# Patient Record
Sex: MALE | Race: WHITE | NOT HISPANIC OR LATINO | Employment: FULL TIME | URBAN - METROPOLITAN AREA
[De-identification: names, ages, dates, MRNs, and addresses within clinical notes are randomized per-mention and may not be internally consistent; named-entity substitution may affect disease eponyms.]

---

## 2017-01-23 ENCOUNTER — OFFICE VISIT (OUTPATIENT)
Dept: INTERNAL MEDICINE | Facility: CLINIC | Age: 42
End: 2017-01-23

## 2017-01-23 VITALS
DIASTOLIC BLOOD PRESSURE: 92 MMHG | TEMPERATURE: 98.3 F | BODY MASS INDEX: 25.45 KG/M2 | RESPIRATION RATE: 12 BRPM | HEIGHT: 73 IN | HEART RATE: 64 BPM | WEIGHT: 192 LBS | SYSTOLIC BLOOD PRESSURE: 142 MMHG

## 2017-01-23 DIAGNOSIS — R55 VASOVAGAL SYNCOPE: ICD-10-CM

## 2017-01-23 DIAGNOSIS — R00.0 TACHYCARDIA: Primary | ICD-10-CM

## 2017-01-23 DIAGNOSIS — F41.9 ANXIETY: ICD-10-CM

## 2017-01-23 PROCEDURE — 99203 OFFICE O/P NEW LOW 30 MIN: CPT | Performed by: FAMILY MEDICINE

## 2017-01-23 PROCEDURE — 93000 ELECTROCARDIOGRAM COMPLETE: CPT | Performed by: FAMILY MEDICINE

## 2017-01-23 NOTE — MR AVS SNAPSHOT
Jefferson Chavez   2017 11:15 AM   Office Visit    Dept Phone:  [phone]   Encounter #:  [id number]    Provider:  Parviz Mata Jr., MD   Department:  [facility]                Your Full Care Plan              Your Updated Medication List          This list is accurate as of: 17  1:03 PM.  Always use your most recent med list.                LORazepam 0.5 MG tablet   Commonly known as:  ATIVAN   Take 1 tablet by mouth every 8 (eight) hours as needed for anxiety.       sertraline 50 MG tablet   Commonly known as:  ZOLOFT               We Performed the Following     Comprehensive Metabolic Panel     ECG 12 Lead     Lipid Panel With / Chol / HDL Ratio       You Were Diagnosed With        Codes Comments    Tachycardia    -  Primary ICD-10-CM: R00.0  ICD-9-CM: 785.0     Anxiety     ICD-10-CM: F41.9  ICD-9-CM: 300.00     Vasovagal syncope     ICD-10-CM: R55  ICD-9-CM: 780.2       Instructions     None    Patient Instructions History      Upcoming Appointments     Visit Type Date Time Department    NEW PATIENT 2017 11:15 AM K  K[facility] 1 0082      [employer] Signup     [facility] [employer] allows you to send messages to your doctor, view your test results, renew your prescriptions, schedule appointments, and more. To sign up, go to [employer] and click on the Sign Up Now link in the New User? box. Enter your [employer] Activation Code exactly as it appears below along with the last four digits of your Social Security Number and your Date of Birth () to complete the sign-up process. If you do not sign up before the expiration date, you must request a new code.    [employer] Activation Code: [email or web address]  Expires: 2017  1:03 PM    If you have questions, you can email [email or web address] or call [phone] to talk to our [employer] staff. Remember, [employer] is NOT to be used for urgent needs. For medical emergencies, dial  "911.               Other Info from Your Visit           Allergies     No Known Allergies      Vital Signs     Blood Pressure Pulse Temperature Respirations Height Weight    142/92 (BP Location: Left arm, Patient Position: Sitting, Cuff Size: Adult) 64 98.3 °F (36.8 °C) (Tympanic) 12 73\" (185.4 cm) 192 lb (87.1 kg)    Body Mass Index Smoking Status                25.33 kg/m2 Former Smoker          Problems and Diagnoses Noted     Fast heart beat    -  Primary    Anxiety problem        Fainting            "

## 2017-01-23 NOTE — PROGRESS NOTES
Subjective   Jefferson Chavez is a 42 y.o. male.     Chief Complaint   Patient presents with   • Anxiety   • Loss of Consciousness   • Rapid Heart Rate         History of Present Illness   Patient is a healthy 42-year-old gentleman is been followed by psychology/psychiatry Dr. Guerrier with history of appears to be situational anxiety and panic attacks.  He is 42 years old he works his wife works he has 4 kids.  Over the past 6 or 8 months has gotten more anxious without any depression or suicidal ideation.  He has been physically well as far as he knows.  He had a panic attack several months ago where his heart seemed to raise.  He was seen and emergency department with negative findings.    Family history includes mom with some history of CAD stents.      The following portions of the patient's history were reviewed and updated as appropriate: allergies, current medications, past social history and problem list.    Review of Systems   Constitutional: Negative.    HENT: Negative.    Eyes: Negative.    Respiratory: Negative.    Cardiovascular: Positive for palpitations.   Gastrointestinal: Negative.    Endocrine: Negative.    Genitourinary: Negative.    Musculoskeletal: Negative.    Skin: Negative.    Allergic/Immunologic: Negative.    Neurological: Positive for syncope.   Hematological: Negative.    Psychiatric/Behavioral: Negative.        Objective   Vitals:    01/23/17 1140   BP: 142/92   Pulse: 64   Resp: 12   Temp: 98.3 °F (36.8 °C)     Physical Exam   Constitutional: He is oriented to person, place, and time. He appears well-developed and well-nourished.   HENT:   Head: Normocephalic and atraumatic.   Right Ear: Tympanic membrane and external ear normal.   Left Ear: Tympanic membrane and external ear normal.   Nose: Nose normal.   Mouth/Throat: Oropharynx is clear and moist.   Eyes: Conjunctivae and EOM are normal. Pupils are equal, round, and reactive to light.   Neck: Normal range of motion. Neck supple. No JVD  present. No thyromegaly present.   Cardiovascular: Normal rate, regular rhythm, normal heart sounds and intact distal pulses.    Pulmonary/Chest: Effort normal and breath sounds normal.   Abdominal: Soft. Bowel sounds are normal.   Musculoskeletal: Normal range of motion.   Lymphadenopathy:     He has no cervical adenopathy.   Neurological: He is alert and oriented to person, place, and time. No cranial nerve deficit. Coordination normal.   Skin: Skin is warm and dry. No rash noted.   Psychiatric: He has a normal mood and affect. His behavior is normal. Judgment and thought content normal.   Vitals reviewed.      Assessment/Plan   Problem List Items Addressed This Visit     None      Visit Diagnoses     Tachycardia    -  Primary    Relevant Orders    Comprehensive Metabolic Panel    Lipid Panel With / Chol / HDL Ratio    ECG 12 Lead    Anxiety        Relevant Orders    Comprehensive Metabolic Panel    Lipid Panel With / Chol / HDL Ratio    Vasovagal syncope        Relevant Orders    Comprehensive Metabolic Panel    Lipid Panel With / Chol / HDL Ratio       EKG performed shows normal sinus rhythm sinus bradycardia.  He states he always passes out when he gets blood drawn.  He has a pretty profound vasovagal response.  Will get CMP lipid panel.  Recheck 6-12 months and when necessary.  Continue follow-up with psychiatry.

## 2017-01-24 LAB
ALBUMIN SERPL-MCNC: 5 G/DL (ref 3.5–5.2)
ALBUMIN/GLOB SERPL: 2 G/DL
ALP SERPL-CCNC: 82 U/L (ref 39–117)
ALT SERPL-CCNC: 57 U/L (ref 1–41)
AST SERPL-CCNC: 34 U/L (ref 1–40)
BILIRUB SERPL-MCNC: 0.7 MG/DL (ref 0.1–1.2)
BUN SERPL-MCNC: 13 MG/DL (ref 6–20)
BUN/CREAT SERPL: 11.7 (ref 7–25)
CALCIUM SERPL-MCNC: 9.8 MG/DL (ref 8.6–10.5)
CHLORIDE SERPL-SCNC: 99 MMOL/L (ref 98–107)
CHOLEST SERPL-MCNC: 307 MG/DL (ref 0–200)
CHOLEST/HDLC SERPL: 4.87 {RATIO}
CO2 SERPL-SCNC: 29 MMOL/L (ref 22–29)
CREAT SERPL-MCNC: 1.11 MG/DL (ref 0.76–1.27)
GLOBULIN SER CALC-MCNC: 2.5 GM/DL
GLUCOSE SERPL-MCNC: 103 MG/DL (ref 65–99)
HDLC SERPL-MCNC: 63 MG/DL (ref 40–60)
LDLC SERPL CALC-MCNC: 199 MG/DL (ref 0–100)
POTASSIUM SERPL-SCNC: 4.5 MMOL/L (ref 3.5–5.2)
PROT SERPL-MCNC: 7.5 G/DL (ref 6–8.5)
SODIUM SERPL-SCNC: 140 MMOL/L (ref 136–145)
TRIGL SERPL-MCNC: 224 MG/DL (ref 0–150)
VLDLC SERPL CALC-MCNC: 44.8 MG/DL (ref 5–40)

## 2018-01-25 ENCOUNTER — OFFICE VISIT (OUTPATIENT)
Dept: INTERNAL MEDICINE | Facility: CLINIC | Age: 43
End: 2018-01-25

## 2018-01-25 ENCOUNTER — HOSPITAL ENCOUNTER (OUTPATIENT)
Dept: GENERAL RADIOLOGY | Facility: HOSPITAL | Age: 43
Discharge: HOME | End: 2018-01-25
Admitting: FAMILY MEDICINE

## 2018-01-25 VITALS
HEART RATE: 64 BPM | WEIGHT: 189 LBS | BODY MASS INDEX: 24.94 KG/M2 | OXYGEN SATURATION: 98 % | TEMPERATURE: 97.9 F | DIASTOLIC BLOOD PRESSURE: 88 MMHG | SYSTOLIC BLOOD PRESSURE: 128 MMHG

## 2018-01-25 DIAGNOSIS — F32.89 OTHER DEPRESSION: ICD-10-CM

## 2018-01-25 DIAGNOSIS — F98.8 ATTENTION DEFICIT DISORDER (ADD) WITHOUT HYPERACTIVITY: Primary | ICD-10-CM

## 2018-01-25 DIAGNOSIS — R05.3 CHRONIC COUGH: ICD-10-CM

## 2018-01-25 DIAGNOSIS — Z00.00 ENCOUNTER FOR WELLNESS EXAMINATION IN ADULT: ICD-10-CM

## 2018-01-25 PROCEDURE — 99396 PREV VISIT EST AGE 40-64: CPT | Performed by: FAMILY MEDICINE

## 2018-01-25 PROCEDURE — 99214 OFFICE O/P EST MOD 30 MIN: CPT | Performed by: FAMILY MEDICINE

## 2018-01-25 PROCEDURE — 71046 X-RAY EXAM CHEST 2 VIEWS: CPT

## 2018-01-25 RX ORDER — LISDEXAMFETAMINE DIMESYLATE 30 MG/1
30 CAPSULE ORAL EVERY MORNING
Qty: 30 CAPSULE | Refills: 0 | Status: SHIPPED | OUTPATIENT
Start: 2018-01-25 | End: 2018-01-25 | Stop reason: SDUPTHER

## 2018-01-25 RX ORDER — LISDEXAMFETAMINE DIMESYLATE 30 MG/1
30 CAPSULE ORAL EVERY MORNING
Qty: 30 CAPSULE | Refills: 0 | Status: SHIPPED | OUTPATIENT
Start: 2018-01-25 | End: 2020-01-07

## 2018-01-25 RX ORDER — LISDEXAMFETAMINE DIMESYLATE 30 MG/1
CAPSULE ORAL
Refills: 0 | COMMUNITY
Start: 2017-11-22 | End: 2018-01-25 | Stop reason: SDUPTHER

## 2018-01-25 RX ORDER — GABAPENTIN 300 MG/1
CAPSULE ORAL
COMMUNITY
Start: 2018-01-23 | End: 2020-01-07

## 2018-01-25 RX ORDER — SERTRALINE HYDROCHLORIDE 100 MG/1
TABLET, FILM COATED ORAL
COMMUNITY
Start: 2018-01-23 | End: 2018-05-11 | Stop reason: SDUPTHER

## 2018-01-25 NOTE — PROGRESS NOTES
Subjective   Jefferson Chavez is a 43 y.o. male.     Chief Complaint   Patient presents with   • Annual Exam   • Cough   • ADD         History of Present Illness   Jefferson is here for annual recheck.  His insurance won't pay for a psychiatrist and he is treated for  depression and ADD.  We decided to try to get off gabapentin and I'll resume treating him for ADD with depression taking sertraline and low-dose Vyvanse 30 mg.  He has no suicidal ideation.  He is quite active.  He has had a chronic cough and little wheezing for 30 days.  No fever or chills.  He works in an area that receives [employer].      The following portions of the patient's history were reviewed and updated as appropriate: allergies, current medications, past social history and problem list.    Review of Systems   Constitutional: Negative.    HENT: Negative.    Eyes: Negative.    Respiratory: Positive for cough.    Cardiovascular: Negative.    Gastrointestinal: Negative.    Endocrine: Negative.    Genitourinary: Negative.    Musculoskeletal: Negative.    Skin: Negative.    Allergic/Immunologic: Negative.    Neurological: Negative.    Hematological: Negative.    Psychiatric/Behavioral: Positive for decreased concentration.       Objective   Vitals:    01/25/18 1143   BP: 128/88   Pulse: 64   Temp: 97.9 °F (36.6 °C)   SpO2: 98%     Physical Exam   Constitutional: He is oriented to person, place, and time. He appears well-developed and well-nourished.   HENT:   Head: Normocephalic and atraumatic.   Right Ear: Tympanic membrane and external ear normal.   Left Ear: Tympanic membrane and external ear normal.   Nose: Nose normal.   Mouth/Throat: Oropharynx is clear and moist.   Eyes: Conjunctivae and EOM are normal. Pupils are equal, round, and reactive to light.   Neck: Normal range of motion. Neck supple. No JVD present. No thyromegaly present.   Cardiovascular: Normal rate, regular rhythm, normal heart sounds and intact distal pulses.     Pulmonary/Chest: Effort normal and breath sounds normal.           Abdominal: Soft. Bowel sounds are normal.   Musculoskeletal: Normal range of motion.   Lymphadenopathy:     He has no cervical adenopathy.   Neurological: He is alert and oriented to person, place, and time. No cranial nerve deficit. Coordination normal.   Skin: Skin is warm and dry. No rash noted.   Psychiatric: He has a normal mood and affect. His behavior is normal. Judgment and thought content normal.   Vitals reviewed.      Assessment/Plan   Problem List Items Addressed This Visit     None      Visit Diagnoses     Attention deficit disorder (ADD) without hyperactivity    -  Primary    Relevant Medications    sertraline (ZOLOFT) 100 MG tablet    VYVANSE 30 MG capsule    Other Relevant Orders    CBC & Differential    Comprehensive Metabolic Panel    Lipid Panel With / Chol / HDL Ratio    Encounter for wellness examination in adult        Relevant Orders    CBC & Differential    Comprehensive Metabolic Panel    Lipid Panel With / Chol / HDL Ratio    Chronic cough        Relevant Orders    XR Chest 2 View    CBC & Differential    Comprehensive Metabolic Panel    Lipid Panel With / Chol / HDL Ratio    Other depression        Relevant Medications    sertraline (ZOLOFT) 100 MG tablet    VYVANSE 30 MG capsule    Other Relevant Orders    CBC & Differential    Comprehensive Metabolic Panel    Lipid Panel With / Chol / HDL Ratio      Plan: Chest x-ray PA lateral return for fasting labs in 6 months preceding the visit in July.  Vyvanse 30 mg every morning.  Refill Zoloft when needed.

## 2018-01-26 RX ORDER — AZITHROMYCIN 250 MG/1
TABLET, FILM COATED ORAL
Qty: 6 TABLET | Refills: 0 | Status: SHIPPED | OUTPATIENT
Start: 2018-01-26 | End: 2018-03-15 | Stop reason: SDUPTHER

## 2018-03-15 ENCOUNTER — TELEPHONE (OUTPATIENT)
Dept: INTERNAL MEDICINE | Facility: CLINIC | Age: 43
End: 2018-03-15

## 2018-03-15 DIAGNOSIS — R05.3 CHRONIC COUGH: Primary | ICD-10-CM

## 2018-03-15 RX ORDER — AZITHROMYCIN 250 MG/1
TABLET, FILM COATED ORAL
Qty: 6 TABLET | Refills: 0 | Status: SHIPPED | OUTPATIENT
Start: 2018-03-15 | End: 2020-01-07

## 2018-03-15 NOTE — TELEPHONE ENCOUNTER
Zithromax Z-GERARD to 50 mg #6.  Referral for pulmonary function testing to check for asthma and reactive airway disease.  Referral also to pulmonary Dr. Poe first available.

## 2018-03-15 NOTE — TELEPHONE ENCOUNTER
Pt came in back in January for a cough and it hasn't gone away if anything he thinks it worse  Please advise

## 2018-03-27 ENCOUNTER — HOSPITAL ENCOUNTER (OUTPATIENT)
Dept: RESPIRATORY THERAPY | Facility: HOSPITAL | Age: 43
Discharge: HOME | End: 2018-03-27
Admitting: FAMILY MEDICINE

## 2018-03-27 DIAGNOSIS — R05.3 CHRONIC COUGH: ICD-10-CM

## 2018-03-27 PROCEDURE — 94729 DIFFUSING CAPACITY: CPT

## 2018-03-27 PROCEDURE — 94640 AIRWAY INHALATION TREATMENT: CPT

## 2018-03-27 PROCEDURE — 94060 EVALUATION OF WHEEZING: CPT

## 2018-03-27 PROCEDURE — 94726 PLETHYSMOGRAPHY LUNG VOLUMES: CPT

## 2018-03-27 RX ORDER — ALBUTEROL SULFATE 2.5 MG/3ML
2.5 SOLUTION RESPIRATORY (INHALATION) ONCE
Status: COMPLETED | OUTPATIENT
Start: 2018-03-27 | End: 2018-03-27

## 2018-03-27 RX ADMIN — ALBUTEROL SULFATE 2.5 MG: 2.5 SOLUTION RESPIRATORY (INHALATION) at 10:08

## 2018-05-11 RX ORDER — SERTRALINE HYDROCHLORIDE 100 MG/1
100 TABLET, FILM COATED ORAL DAILY
Qty: 30 TABLET | Refills: 1 | Status: SHIPPED | OUTPATIENT
Start: 2018-05-11 | End: 2018-07-16 | Stop reason: SDUPTHER

## 2018-07-16 RX ORDER — SERTRALINE HYDROCHLORIDE 100 MG/1
100 TABLET, FILM COATED ORAL DAILY
Qty: 30 TABLET | Refills: 0 | Status: SHIPPED | OUTPATIENT
Start: 2018-07-16 | End: 2018-08-13 | Stop reason: SDUPTHER

## 2018-07-25 ENCOUNTER — RESULTS ENCOUNTER (OUTPATIENT)
Dept: INTERNAL MEDICINE | Facility: CLINIC | Age: 43
End: 2018-07-25

## 2018-07-25 DIAGNOSIS — Z00.00 ENCOUNTER FOR WELLNESS EXAMINATION IN ADULT: ICD-10-CM

## 2018-07-25 DIAGNOSIS — F98.8 ATTENTION DEFICIT DISORDER (ADD) WITHOUT HYPERACTIVITY: ICD-10-CM

## 2018-07-25 DIAGNOSIS — F32.89 OTHER DEPRESSION: ICD-10-CM

## 2018-07-25 DIAGNOSIS — R05.3 CHRONIC COUGH: ICD-10-CM

## 2018-08-13 RX ORDER — SERTRALINE HYDROCHLORIDE 100 MG/1
100 TABLET, FILM COATED ORAL DAILY
Qty: 30 TABLET | Refills: 0 | Status: SHIPPED | OUTPATIENT
Start: 2018-08-13 | End: 2018-11-06 | Stop reason: SDUPTHER

## 2018-09-25 RX ORDER — SERTRALINE HYDROCHLORIDE 100 MG/1
100 TABLET, FILM COATED ORAL DAILY
Qty: 30 TABLET | Refills: 0 | OUTPATIENT
Start: 2018-09-25

## 2018-11-06 RX ORDER — SERTRALINE HYDROCHLORIDE 100 MG/1
100 TABLET, FILM COATED ORAL DAILY
Qty: 90 TABLET | Refills: 0 | Status: SHIPPED | OUTPATIENT
Start: 2018-11-06 | End: 2019-01-08 | Stop reason: SDUPTHER

## 2019-01-08 RX ORDER — SERTRALINE HYDROCHLORIDE 100 MG/1
100 TABLET, FILM COATED ORAL DAILY
Qty: 90 TABLET | Refills: 1 | Status: SHIPPED | OUTPATIENT
Start: 2019-01-08 | End: 2019-08-04 | Stop reason: SDUPTHER

## 2019-08-05 RX ORDER — SERTRALINE HYDROCHLORIDE 100 MG/1
100 TABLET, FILM COATED ORAL DAILY
Qty: 90 TABLET | Refills: 0 | Status: SHIPPED | OUTPATIENT
Start: 2019-08-05 | End: 2019-11-26 | Stop reason: SDUPTHER

## 2019-11-08 RX ORDER — SERTRALINE HYDROCHLORIDE 100 MG/1
100 TABLET, FILM COATED ORAL DAILY
Qty: 90 TABLET | Refills: 0 | OUTPATIENT
Start: 2019-11-08

## 2019-11-18 RX ORDER — SERTRALINE HYDROCHLORIDE 100 MG/1
100 TABLET, FILM COATED ORAL DAILY
Qty: 90 TABLET | Refills: 0 | OUTPATIENT
Start: 2019-11-18

## 2019-11-26 RX ORDER — SERTRALINE HYDROCHLORIDE 100 MG/1
100 TABLET, FILM COATED ORAL DAILY
Qty: 90 TABLET | Refills: 0 | Status: SHIPPED | OUTPATIENT
Start: 2019-11-26 | End: 2020-01-07 | Stop reason: SDUPTHER

## 2020-01-07 ENCOUNTER — OFFICE VISIT (OUTPATIENT)
Dept: INTERNAL MEDICINE | Facility: CLINIC | Age: 45
End: 2020-01-07

## 2020-01-07 VITALS
HEART RATE: 75 BPM | TEMPERATURE: 98.2 F | BODY MASS INDEX: 26.78 KG/M2 | OXYGEN SATURATION: 96 % | DIASTOLIC BLOOD PRESSURE: 74 MMHG | SYSTOLIC BLOOD PRESSURE: 140 MMHG | WEIGHT: 203 LBS

## 2020-01-07 DIAGNOSIS — F32.A DEPRESSION, UNSPECIFIED DEPRESSION TYPE: ICD-10-CM

## 2020-01-07 DIAGNOSIS — Z99.89 OSA ON CPAP: Primary | ICD-10-CM

## 2020-01-07 DIAGNOSIS — E78.2 MIXED HYPERLIPIDEMIA: ICD-10-CM

## 2020-01-07 DIAGNOSIS — G47.33 OSA ON CPAP: Primary | ICD-10-CM

## 2020-01-07 PROCEDURE — 99213 OFFICE O/P EST LOW 20 MIN: CPT | Performed by: FAMILY MEDICINE

## 2020-01-07 RX ORDER — SERTRALINE HYDROCHLORIDE 100 MG/1
100 TABLET, FILM COATED ORAL DAILY
Qty: 90 TABLET | Refills: 3 | Status: SHIPPED | OUTPATIENT
Start: 2020-01-07 | End: 2021-01-14 | Stop reason: SDUPTHER

## 2020-01-07 NOTE — PROGRESS NOTES
Subjective   Jefferson Chavez is a 44 y.o. male.     Chief Complaint   Patient presents with   • Depression   • Hyperlipidemia         History of Present Illness   Delightful gentleman with a history of depression well treated with sertraline 100 mg daily.  He is off stimulants for history of ADD because it caused too many side effects.  He does well with sertraline 100 mg daily we will continue this he is very functional.  I also discussed cardiovascular risk factors he does pass out drawing blood 2 years ago he had very elevated cholesterol.  There is a family history of hypercholesterolemia and his mother has had a series of a couple of heart attacks.      The following portions of the patient's history were reviewed and updated as appropriate: allergies, current medications, past social history and problem list.    Review of Systems   Constitutional: Negative.    HENT: Negative.    Eyes: Negative.    Respiratory: Negative.    Cardiovascular: Negative.    Gastrointestinal: Negative.    Endocrine: Negative.    Genitourinary: Negative.    Musculoskeletal: Negative.    Skin: Negative.    Allergic/Immunologic: Negative.    Neurological: Negative.    Hematological: Negative.    Psychiatric/Behavioral: Negative.        Objective   Vitals:    01/07/20 1234   BP: 140/74   Pulse: 75   Temp: 98.2 °F (36.8 °C)   SpO2: 96%     Physical Exam   Constitutional: He is oriented to person, place, and time. He appears well-developed and well-nourished.   HENT:   Head: Normocephalic and atraumatic.   Right Ear: Tympanic membrane and external ear normal.   Left Ear: Tympanic membrane and external ear normal.   Nose: Nose normal.   Mouth/Throat: Oropharynx is clear and moist.   Eyes: Pupils are equal, round, and reactive to light. Conjunctivae and EOM are normal.   Neck: Normal range of motion. Neck supple. No JVD present. No thyromegaly present.   Cardiovascular: Normal rate, regular rhythm, normal heart sounds and intact distal  pulses.   Pulmonary/Chest: Effort normal and breath sounds normal.   Abdominal: Soft. Bowel sounds are normal.   Musculoskeletal: Normal range of motion.   Lymphadenopathy:     He has no cervical adenopathy.   Neurological: He is alert and oriented to person, place, and time. No cranial nerve deficit. Coordination normal.   Skin: Skin is warm and dry. No rash noted.   Psychiatric: He has a normal mood and affect. His behavior is normal. Judgment and thought content normal.   Vitals reviewed.      Assessment/Plan   Problem List Items Addressed This Visit     None      Visit Diagnoses     SELENA on CPAP    -  Primary    Depression, unspecified depression type        Relevant Medications    sertraline (ZOLOFT) 100 MG tablet    Mixed hyperlipidemia        Relevant Orders    Comprehensive Metabolic Panel    Lipid Panel With / Chol / HDL Ratio      Plan: He has obstructive sleep apnea on CPAP nasal pillows refill sertraline 100 mg daily for a year.  We will have him return with his wife to get CMP lipid panel fasting.

## 2020-02-07 ENCOUNTER — RESULTS ENCOUNTER (OUTPATIENT)
Dept: INTERNAL MEDICINE | Facility: CLINIC | Age: 45
End: 2020-02-07

## 2020-02-07 DIAGNOSIS — E78.2 MIXED HYPERLIPIDEMIA: ICD-10-CM

## 2020-10-29 ENCOUNTER — RESULTS ENCOUNTER (OUTPATIENT)
Dept: INTERNAL MEDICINE | Facility: CLINIC | Age: 45
End: 2020-10-29

## 2020-10-29 DIAGNOSIS — E78.2 MIXED HYPERLIPIDEMIA: ICD-10-CM

## 2020-10-29 LAB
ALBUMIN SERPL-MCNC: 4.9 G/DL (ref 3.5–5.2)
ALBUMIN/GLOB SERPL: 2.5 G/DL
ALP SERPL-CCNC: 95 U/L (ref 39–117)
ALT SERPL-CCNC: 155 U/L (ref 1–41)
AST SERPL-CCNC: 88 U/L (ref 1–40)
BILIRUB SERPL-MCNC: 1 MG/DL (ref 0–1.2)
BUN SERPL-MCNC: 17 MG/DL (ref 6–20)
BUN/CREAT SERPL: 13.9 (ref 7–25)
CALCIUM SERPL-MCNC: 9.6 MG/DL (ref 8.6–10.5)
CHLORIDE SERPL-SCNC: 100 MMOL/L (ref 98–107)
CHOLEST SERPL-MCNC: 324 MG/DL (ref 0–200)
CHOLEST/HDLC SERPL: 5.68 {RATIO}
CO2 SERPL-SCNC: 27.1 MMOL/L (ref 22–29)
CREAT SERPL-MCNC: 1.22 MG/DL (ref 0.76–1.27)
GLOBULIN SER CALC-MCNC: 2 GM/DL
GLUCOSE SERPL-MCNC: 106 MG/DL (ref 65–99)
HDLC SERPL-MCNC: 57 MG/DL (ref 40–60)
LDLC SERPL CALC-MCNC: 226 MG/DL (ref 0–100)
POTASSIUM SERPL-SCNC: 4.5 MMOL/L (ref 3.5–5.2)
PROT SERPL-MCNC: 6.9 G/DL (ref 6–8.5)
SODIUM SERPL-SCNC: 138 MMOL/L (ref 136–145)
TRIGL SERPL-MCNC: 207 MG/DL (ref 0–150)
VLDLC SERPL CALC-MCNC: 41 MG/DL (ref 5–40)

## 2020-11-13 ENCOUNTER — TELEPHONE (OUTPATIENT)
Dept: INTERNAL MEDICINE | Facility: CLINIC | Age: 45
End: 2020-11-13

## 2020-11-13 RX ORDER — ROSUVASTATIN CALCIUM 5 MG/1
5 TABLET, COATED ORAL DAILY
Qty: 90 TABLET | Refills: 1 | Status: SHIPPED | OUTPATIENT
Start: 2020-11-13 | End: 2021-01-14 | Stop reason: SDUPTHER

## 2020-11-13 NOTE — TELEPHONE ENCOUNTER
"Caller: Jefferson Chavez    Relationship: Self    Best call back number: [phone]    What medication are you requesting: A LOW DOSE CHOLESTEROL MEDICATION     If a prescription is needed, what is your preferred pharmacy and phone number: [id number] [address] AT [facility] [phone] [facility] [phone] FX     Additional notes: PATIENT RECEIVED HIS LAB RESULTS IN THE MAIL AND AT THE BOTTOM OF THE RESULTS THERE WAS A NOTATION THAT DR HEBERT WAS GOING TO SEND IN A LOW DOSE CHOLESTEROL MEDICATION DUE TO \"SOME ALARMINGLY HIGH RESULTS\" BUT PATIENT HAS CONTACTED THE PHARMACY AND NOTHING HAS BEEN CALLED IN AS OF YET SO HE WANTED TO CHECK WITH DR HEBERT.     PLEASE ADVISE           "

## 2021-01-14 ENCOUNTER — OFFICE VISIT (OUTPATIENT)
Dept: INTERNAL MEDICINE | Facility: CLINIC | Age: 46
End: 2021-01-14

## 2021-01-14 VITALS
BODY MASS INDEX: 27.3 KG/M2 | WEIGHT: 206 LBS | OXYGEN SATURATION: 98 % | HEIGHT: 73 IN | TEMPERATURE: 97.5 F | DIASTOLIC BLOOD PRESSURE: 88 MMHG | SYSTOLIC BLOOD PRESSURE: 130 MMHG | HEART RATE: 68 BPM

## 2021-01-14 DIAGNOSIS — E78.2 MIXED HYPERLIPIDEMIA: ICD-10-CM

## 2021-01-14 DIAGNOSIS — Z00.00 ANNUAL PHYSICAL EXAM: ICD-10-CM

## 2021-01-14 DIAGNOSIS — B35.4 TINEA CORPORIS: Primary | ICD-10-CM

## 2021-01-14 PROCEDURE — 99396 PREV VISIT EST AGE 40-64: CPT | Performed by: FAMILY MEDICINE

## 2021-01-14 RX ORDER — SERTRALINE HYDROCHLORIDE 100 MG/1
100 TABLET, FILM COATED ORAL DAILY
Qty: 90 TABLET | Refills: 3 | Status: SHIPPED | OUTPATIENT
Start: 2021-01-14 | End: 2021-07-09 | Stop reason: SDUPTHER

## 2021-01-14 RX ORDER — ROSUVASTATIN CALCIUM 5 MG/1
5 TABLET, COATED ORAL DAILY
Qty: 90 TABLET | Refills: 3 | Status: SHIPPED | OUTPATIENT
Start: 2021-01-14 | End: 2021-07-09 | Stop reason: SDUPTHER

## 2021-01-14 RX ORDER — KETOCONAZOLE 20 MG/G
CREAM TOPICAL DAILY
Qty: 60 G | Refills: 1 | Status: SHIPPED | OUTPATIENT
Start: 2021-01-14 | End: 2021-07-09

## 2021-01-14 NOTE — PROGRESS NOTES
"Chief Complaint  Annual Exam (Physical)    Subjective          Jefferson Chavez presents to [facility] for   Very pleasant gentleman here for annual physical.  Issues discussed include diet exercise and also hyperlipidemia.  He started on Crestor 5 mg daily in November.  We will repeat labs in approximate 4 months he has been on a relatively low-fat diet.    He has developed tinea corporis/ringworm in the antecubital fossa of the right arm.      Objective   Vital Signs:   /88 (BP Location: Right arm, Patient Position: Sitting, Cuff Size: Adult)   Pulse 68   Temp 97.5 °F (36.4 °C)   Ht 185.4 cm (73\")   Wt 93.4 kg (206 lb)   SpO2 98%   BMI 27.18 kg/m²     Physical Exam  Vitals signs reviewed.   Constitutional:       Appearance: He is well-developed.   HENT:      Head: Normocephalic and atraumatic.      Right Ear: Tympanic membrane and external ear normal.      Left Ear: Tympanic membrane and external ear normal.      Nose: Nose normal.   Eyes:      Conjunctiva/sclera: Conjunctivae normal.      Pupils: Pupils are equal, round, and reactive to light.   Neck:      Musculoskeletal: Normal range of motion and neck supple.      Thyroid: No thyromegaly.      Vascular: No JVD.   Cardiovascular:      Rate and Rhythm: Normal rate and regular rhythm.      Heart sounds: Normal heart sounds.   Pulmonary:      Effort: Pulmonary effort is normal.      Breath sounds: Normal breath sounds.   Abdominal:      General: Bowel sounds are normal.      Palpations: Abdomen is soft.   Musculoskeletal: Normal range of motion.   Lymphadenopathy:      Cervical: No cervical adenopathy.   Skin:     General: Skin is warm and dry.      Findings: No rash.          Neurological:      Mental Status: He is alert and oriented to person, place, and time.      Cranial Nerves: No cranial nerve deficit.      Coordination: Coordination normal.   Psychiatric:         Behavior: Behavior normal.         Thought " Content: Thought content normal.         Judgment: Judgment normal.        Result Review :   The following data was reviewed by: Parviz Mata Jr., MD on 01/14/2021:  Common labs    Common Labsle 10/29/20 10/29/20    1002 1002   Glucose 106 (A)    BUN 17    Creatinine 1.22    eGFR Non  Am 65    eGFR African Am 79    Sodium 138    Potassium 4.5    Chloride 100    Calcium 9.6    Total Protein 6.9    Albumin 4.90    Total Bilirubin 1.0    Alkaline Phosphatase 95    AST (SGOT) 88 (A)    ALT (SGPT) 155 (A)    Total Cholesterol  324 (A)   Triglycerides  207 (A)   HDL Cholesterol  57   LDL Cholesterol   226 (A)   (A) Abnormal value                      Assessment and Plan    Problem List Items Addressed This Visit     None      Visit Diagnoses     Tinea corporis    -  Primary    Annual physical exam        Mixed hyperlipidemia        Relevant Medications    rosuvastatin (Crestor) 5 MG tablet    Other Relevant Orders    Comprehensive Metabolic Panel    Lipid Panel With / Chol / HDL Ratio      Plan: Ketoconazole applied daily prescription 30 days or more if no resolution referral to dermatology.    Continue Crestor 5 mg daily.    Repeat labs within the next 4 months.  Recheck in 6 months.  Discussion of healthy lifestyle diet exercise health maintenance.    Follow Up   Return in about 6 months (around 7/14/2021) for Annual physical.  Patient was given instructions and counseling regarding his condition or for health maintenance advice. Please see specific information pulled into the AVS if appropriate.

## 2021-07-09 ENCOUNTER — OFFICE VISIT (OUTPATIENT)
Dept: INTERNAL MEDICINE | Facility: CLINIC | Age: 46
End: 2021-07-09

## 2021-07-09 VITALS
OXYGEN SATURATION: 98 % | DIASTOLIC BLOOD PRESSURE: 100 MMHG | HEIGHT: 73 IN | WEIGHT: 209 LBS | HEART RATE: 73 BPM | TEMPERATURE: 98.5 F | SYSTOLIC BLOOD PRESSURE: 162 MMHG | BODY MASS INDEX: 27.7 KG/M2

## 2021-07-09 DIAGNOSIS — K76.0 FATTY LIVER: ICD-10-CM

## 2021-07-09 DIAGNOSIS — R74.8 ELEVATED LIVER ENZYMES: ICD-10-CM

## 2021-07-09 DIAGNOSIS — E78.2 MIXED HYPERLIPIDEMIA: Primary | ICD-10-CM

## 2021-07-09 DIAGNOSIS — F41.9 ANXIETY: ICD-10-CM

## 2021-07-09 DIAGNOSIS — I10 ESSENTIAL HYPERTENSION: ICD-10-CM

## 2021-07-09 PROCEDURE — 99214 OFFICE O/P EST MOD 30 MIN: CPT | Performed by: FAMILY MEDICINE

## 2021-07-09 RX ORDER — SERTRALINE HYDROCHLORIDE 100 MG/1
100 TABLET, FILM COATED ORAL DAILY
Qty: 90 TABLET | Refills: 3 | Status: SHIPPED | OUTPATIENT
Start: 2021-07-09 | End: 2022-05-27 | Stop reason: SDUPTHER

## 2021-07-09 RX ORDER — ROSUVASTATIN CALCIUM 5 MG/1
5 TABLET, COATED ORAL DAILY
Qty: 90 TABLET | Refills: 3 | Status: SHIPPED | OUTPATIENT
Start: 2021-07-09 | End: 2022-03-14

## 2021-07-09 RX ORDER — LOSARTAN POTASSIUM 25 MG/1
25 TABLET ORAL DAILY
Qty: 90 TABLET | Refills: 3 | Status: SHIPPED | OUTPATIENT
Start: 2021-07-09 | End: 2022-05-27 | Stop reason: SDUPTHER

## 2021-07-09 NOTE — PROGRESS NOTES
"Chief Complaint  Follow-up, Anxiety, and Hyperlipidemia    Subjective          Jefferson Chavez presents to [facility]  Very pleasant gentleman noted to have elevated liver enzymes and genetically elevated lipid panel with initiation of rosuvastatin 5 mg daily on last visit with continuance of sertraline 100 mg daily for anxiety.  His blood pressures continue to be elevated when he is counseled on home blood pressure monitoring we will start losartan 25 mg daily.  Labs will be drawn for hepatitis C as well as repeat liver enzymes lipid panel.    Counseled on cardiovascular risk factors and hyperlipidemia hypertension and also etiology and possible etiologies of elevated liver enzymes and also fatty liver.      Objective   Vital Signs:   /100   Pulse 73   Temp 98.5 °F (36.9 °C)   Ht 185.4 cm (73\")   Wt 94.8 kg (209 lb)   SpO2 98%   BMI 27.57 kg/m²     Physical Exam  Vitals reviewed.   Constitutional:       Appearance: He is well-developed.   HENT:      Head: Normocephalic and atraumatic.      Right Ear: Tympanic membrane and external ear normal.      Left Ear: Tympanic membrane and external ear normal.   Eyes:      Conjunctiva/sclera: Conjunctivae normal.      Pupils: Pupils are equal, round, and reactive to light.   Neck:      Thyroid: No thyromegaly.      Vascular: No JVD.   Cardiovascular:      Rate and Rhythm: Normal rate and regular rhythm.      Heart sounds: Normal heart sounds.   Pulmonary:      Effort: Pulmonary effort is normal.      Breath sounds: Normal breath sounds.   Abdominal:      General: Bowel sounds are normal.      Palpations: Abdomen is soft.   Musculoskeletal:         General: Normal range of motion.      Cervical back: Normal range of motion and neck supple.   Lymphadenopathy:      Cervical: No cervical adenopathy.   Skin:     General: Skin is warm and dry.      Findings: No rash.   Neurological:      Mental Status: He is alert and oriented to person, " place, and time.      Cranial Nerves: No cranial nerve deficit.      Coordination: Coordination normal.   Psychiatric:         Behavior: Behavior normal.         Thought Content: Thought content normal.         Judgment: Judgment normal.        Result Review :{Labs  Result Review  Imaging  Med Tab  Media  Procedures :23}                 Assessment and Plan    Diagnoses and all orders for this visit:    1. Mixed hyperlipidemia (Primary)  Comments:  Continue Crestor 5 mg daily  Orders:  -     CBC & Differential  -     Comprehensive Metabolic Panel  -     Lipid Panel With / Chol / HDL Ratio  -     TSH  -     T4, Free  -     Urinalysis With Microscopic If Indicated (No Culture) - Urine, Clean Catch  -     Vitamin B12  -     Folate  -     Hepatitis C Antibody  -     HARO Fibrosure    2. Essential hypertension  Comments:  Losartan 25 mg daily  Orders:  -     CBC & Differential  -     Comprehensive Metabolic Panel  -     Lipid Panel With / Chol / HDL Ratio  -     TSH  -     T4, Free  -     Urinalysis With Microscopic If Indicated (No Culture) - Urine, Clean Catch  -     Vitamin B12  -     Folate  -     Hepatitis C Antibody  -     HARO Fibrosure    3. Anxiety  Comments:  Zoloft 100 mg daily    4. Elevated liver enzymes  -     CBC & Differential  -     Comprehensive Metabolic Panel  -     Lipid Panel With / Chol / HDL Ratio  -     TSH  -     T4, Free  -     Urinalysis With Microscopic If Indicated (No Culture) - Urine, Clean Catch  -     Vitamin B12  -     Folate  -     Hepatitis C Antibody  -     HARO Fibrosure    5. Fatty liver  -     CBC & Differential  -     Comprehensive Metabolic Panel  -     Lipid Panel With / Chol / HDL Ratio  -     TSH  -     T4, Free  -     Urinalysis With Microscopic If Indicated (No Culture) - Urine, Clean Catch  -     Vitamin B12  -     Folate  -     Hepatitis C Antibody  -     HARO Fibrosure    Other orders  -     sertraline (ZOLOFT) 100 MG tablet; Take 1 tablet by mouth Daily.  Dispense:  90 tablet; Refill: 3  -     rosuvastatin (Crestor) 5 MG tablet; Take 1 tablet by mouth Daily.  Dispense: 90 tablet; Refill: 3  -     losartan (Cozaar) 25 MG tablet; Take 1 tablet by mouth Daily.  Dispense: 90 tablet; Refill: 3        Follow Up   Return in about 4 months (around 11/9/2021) for Recheck.  Patient was given instructions and counseling regarding his condition or for health maintenance advice. Please see specific information pulled into the AVS if appropriate.

## 2021-07-13 LAB
A2 MACROGLOB SERPL-MCNC: 120 MG/DL (ref 110–276)
ALBUMIN SERPL-MCNC: 5 G/DL (ref 3.5–5.2)
ALBUMIN/GLOB SERPL: 2.3 G/DL
ALP SERPL-CCNC: 107 U/L (ref 39–117)
ALT SERPL W P-5'-P-CCNC: 250 IU/L (ref 0–55)
ALT SERPL-CCNC: 222 U/L (ref 1–41)
APO A-I SERPL-MCNC: 164 MG/DL (ref 101–178)
APPEARANCE UR: CLEAR
AST SERPL W P-5'-P-CCNC: 220 IU/L (ref 0–40)
AST SERPL-CCNC: 192 U/L (ref 1–40)
BACTERIA #/AREA URNS HPF: NORMAL /HPF
BASOPHILS # BLD AUTO: 0.05 10*3/MM3 (ref 0–0.2)
BASOPHILS NFR BLD AUTO: 0.9 % (ref 0–1.5)
BILIRUB SERPL-MCNC: 0.8 MG/DL (ref 0–1.2)
BILIRUB SERPL-MCNC: 0.9 MG/DL (ref 0–1.2)
BILIRUB UR QL STRIP: NEGATIVE
BUN SERPL-MCNC: 12 MG/DL (ref 6–20)
BUN/CREAT SERPL: 12.1 (ref 7–25)
CALCIUM SERPL-MCNC: 9.8 MG/DL (ref 8.6–10.5)
CASTS URNS MICRO: NORMAL
CHLORIDE SERPL-SCNC: 101 MMOL/L (ref 98–107)
CHOLEST SERPL-MCNC: 215 MG/DL (ref 0–200)
CHOLEST SERPL-MCNC: 225 MG/DL (ref 100–199)
CHOLEST/HDLC SERPL: 3.36 {RATIO}
CO2 SERPL-SCNC: 28.2 MMOL/L (ref 22–29)
COLOR UR: YELLOW
CREAT SERPL-MCNC: 0.99 MG/DL (ref 0.76–1.27)
EOSINOPHIL # BLD AUTO: 0.2 10*3/MM3 (ref 0–0.4)
EOSINOPHIL NFR BLD AUTO: 3.6 % (ref 0.3–6.2)
EPI CELLS #/AREA URNS HPF: NORMAL /HPF
ERYTHROCYTE [DISTWIDTH] IN BLOOD BY AUTOMATED COUNT: 12.6 % (ref 12.3–15.4)
FIBROSIS SCORING:: ABNORMAL
FIBROSIS STAGE SERPL QL: ABNORMAL
FOLATE SERPL-MCNC: 10 NG/ML (ref 4.78–24.2)
GGT SERPL-CCNC: 507 IU/L (ref 0–65)
GLOBULIN SER CALC-MCNC: 2.2 GM/DL
GLUCOSE SERPL-MCNC: 104 MG/DL (ref 65–99)
GLUCOSE SERPL-MCNC: 106 MG/DL (ref 65–99)
GLUCOSE UR QL: NEGATIVE
HAPTOGLOB SERPL-MCNC: 71 MG/DL (ref 23–355)
HCT VFR BLD AUTO: 46.5 % (ref 37.5–51)
HCV AB S/CO SERPL IA: <0.1 S/CO RATIO (ref 0–0.9)
HDLC SERPL-MCNC: 64 MG/DL (ref 40–60)
HGB BLD-MCNC: 15.7 G/DL (ref 13–17.7)
HGB UR QL STRIP: NEGATIVE
IMM GRANULOCYTES # BLD AUTO: 0.03 10*3/MM3 (ref 0–0.05)
IMM GRANULOCYTES NFR BLD AUTO: 0.5 % (ref 0–0.5)
INTERPRETATIONS: (REFERENCE): ABNORMAL
KETONES UR QL STRIP: NEGATIVE
LABORATORY COMMENT REPORT: ABNORMAL
LDLC SERPL CALC-MCNC: 128 MG/DL (ref 0–100)
LEUKOCYTE ESTERASE UR QL STRIP: NEGATIVE
LIVER FIBR SCORE SERPL CALC.FIBROSURE: 0.34 (ref 0–0.21)
LYMPHOCYTES # BLD AUTO: 0.65 10*3/MM3 (ref 0.7–3.1)
LYMPHOCYTES NFR BLD AUTO: 11.8 % (ref 19.6–45.3)
MCH RBC QN AUTO: 32 PG (ref 26.6–33)
MCHC RBC AUTO-ENTMCNC: 33.8 G/DL (ref 31.5–35.7)
MCV RBC AUTO: 94.7 FL (ref 79–97)
MONOCYTES # BLD AUTO: 0.8 10*3/MM3 (ref 0.1–0.9)
MONOCYTES NFR BLD AUTO: 14.5 % (ref 5–12)
NASH SCORING (REFERENCE): ABNORMAL
NECROINFLAMMATORY ACT GRADE SERPL QL: ABNORMAL
NECROINFLAMMATORY ACT SCORE SERPL: 0.5
NEUTROPHILS # BLD AUTO: 3.8 10*3/MM3 (ref 1.7–7)
NEUTROPHILS NFR BLD AUTO: 68.7 % (ref 42.7–76)
NITRITE UR QL STRIP: NEGATIVE
NRBC BLD AUTO-RTO: 0 /100 WBC (ref 0–0.2)
PH UR STRIP: 7.5 [PH] (ref 5–8)
PLATELET # BLD AUTO: 185 10*3/MM3 (ref 140–450)
POTASSIUM SERPL-SCNC: 4.3 MMOL/L (ref 3.5–5.2)
PROT SERPL-MCNC: 7.2 G/DL (ref 6–8.5)
PROT UR QL STRIP: ABNORMAL
RBC # BLD AUTO: 4.91 10*6/MM3 (ref 4.14–5.8)
RBC #/AREA URNS HPF: NORMAL /HPF
SERVICE CMNT-IMP: ABNORMAL
SODIUM SERPL-SCNC: 141 MMOL/L (ref 136–145)
SP GR UR: 1.02 (ref 1–1.03)
STEATOSIS GRADE (REFERENCE): ABNORMAL
STEATOSIS GRADING (REFERENCE): ABNORMAL
STEATOSIS SCORE (REFERENCE): 0.89 (ref 0–0.3)
T4 FREE SERPL-MCNC: 1.14 NG/DL (ref 0.93–1.7)
TRIGL SERPL-MCNC: 129 MG/DL (ref 0–150)
TRIGL SERPL-MCNC: 144 MG/DL (ref 0–149)
TSH SERPL DL<=0.005 MIU/L-ACNC: 3.99 UIU/ML (ref 0.27–4.2)
UROBILINOGEN UR STRIP-MCNC: ABNORMAL MG/DL
VIT B12 SERPL-MCNC: 701 PG/ML (ref 211–946)
VLDLC SERPL CALC-MCNC: 23 MG/DL (ref 5–40)
WBC # BLD AUTO: 5.53 10*3/MM3 (ref 3.4–10.8)
WBC #/AREA URNS HPF: NORMAL /HPF

## 2021-08-17 ENCOUNTER — HOSPITAL ENCOUNTER (OUTPATIENT)
Dept: ULTRASOUND IMAGING | Facility: HOSPITAL | Age: 46
Discharge: HOME | End: 2021-08-17
Admitting: FAMILY MEDICINE

## 2021-08-17 DIAGNOSIS — R74.8 ELEVATED LIVER ENZYMES: ICD-10-CM

## 2021-08-17 PROCEDURE — 76705 ECHO EXAM OF ABDOMEN: CPT

## 2021-08-26 ENCOUNTER — OFFICE VISIT (OUTPATIENT)
Dept: GASTROENTEROLOGY | Facility: CLINIC | Age: 46
End: 2021-08-26

## 2021-08-26 VITALS
SYSTOLIC BLOOD PRESSURE: 140 MMHG | DIASTOLIC BLOOD PRESSURE: 90 MMHG | BODY MASS INDEX: 26.98 KG/M2 | HEIGHT: 74 IN | TEMPERATURE: 96.9 F | OXYGEN SATURATION: 97 % | HEART RATE: 71 BPM | WEIGHT: 210.2 LBS

## 2021-08-26 DIAGNOSIS — R74.8 ELEVATED LIVER ENZYMES: Primary | ICD-10-CM

## 2021-08-26 DIAGNOSIS — K21.9 GASTROESOPHAGEAL REFLUX DISEASE, UNSPECIFIED WHETHER ESOPHAGITIS PRESENT: ICD-10-CM

## 2021-08-26 DIAGNOSIS — K76.0 FATTY LIVER: ICD-10-CM

## 2021-08-26 PROCEDURE — 99204 OFFICE O/P NEW MOD 45 MIN: CPT | Performed by: NURSE PRACTITIONER

## 2021-08-26 NOTE — PROGRESS NOTES
Chief Complaint   Patient presents with   • Elevated Hepatic Enzymes         History of Present Illness  46-year-old male presents today for evaluation of elevated liver enzymes.  He underwent a Hudson fibrosure on 7/9/2021 which revealed a mildly elevated fibrosis score of 0.34 and an elevated steatosis score of 0.89.  His Hudson score reference value was 0.50 and GGT was highly elevated at 507.  He was also noted to have significant elevations in his transaminases with an ALT of 250 and an AST of 220.  He was recently taken off of his Crestor.  Hepatitis C antibody was negative.  Liver ultrasound on 8/1/2021 revealed fatty liver he was also noted to have a subtle renal mass and further recommendations were to proceed with a CT of the abdomen with and without contrast for further evaluation, which has not yet been performed.    He reports that his Crestor was discontinued approximately 2 weeks ago when he been taking the medication for about 8 months.  He does report drinking approximately 10 beers per day on Saturday and Sunday and on the weekdays will have 2 glasses of bourbon.  He denies any IV drug use.  He does not exercise regularly.  He denies any family history of liver disease.  He denies any jaundice or right upper quadrant abdominal pain.    He reports a history of heartburn and reflux and uses Prilosec as needed in the morning hours, which is typically when he has his symptoms.  He cannot correlate this with any specific types of food.  He denies any true nausea or vomiting.  He denies dysphagia.    He reports having regular daily bowel movements and denies having diarrhea, constipation, melena, or hematochezia.  He denies any family history of colon cancer or colon polyps.  He will be due for his first colonoscopy at age 47.    Review of Systems   Constitutional: Negative for fever and unexpected weight change.   HENT: Negative for trouble swallowing.    Cardiovascular: Negative for chest pain.  "  Gastrointestinal: Negative for abdominal distention, abdominal pain, anal bleeding, blood in stool, constipation, diarrhea, nausea, rectal pain and vomiting.      Result Review :       HARO Fibrosure (07/09/2021 09:05)  Hepatitis C Antibody (07/09/2021 09:05)  US Liver (08/17/2021 17:19)    Vital Signs:   /90   Pulse 71   Temp 96.9 °F (36.1 °C)   Ht 188 cm (74\")   Wt 95.3 kg (210 lb 3.2 oz)   SpO2 97%   BMI 26.99 kg/m²     Body mass index is 26.99 kg/m².     Physical Exam  Vitals reviewed.   Constitutional:       Appearance: Normal appearance. He is normal weight.   HENT:      Head: Normocephalic.      Nose: Nose normal.      Mouth/Throat:      Mouth: Mucous membranes are moist.   Eyes:      General: No scleral icterus.     Extraocular Movements: Extraocular movements intact.   Cardiovascular:      Rate and Rhythm: Normal rate and regular rhythm.      Pulses: Normal pulses.      Heart sounds: Normal heart sounds. No murmur heard.   No friction rub. No gallop.    Pulmonary:      Effort: Pulmonary effort is normal. No respiratory distress.      Breath sounds: Normal breath sounds.   Abdominal:      General: Abdomen is flat. Bowel sounds are normal. There is no distension.      Palpations: Abdomen is soft. There is no mass.      Tenderness: There is no abdominal tenderness. There is no guarding.   Musculoskeletal:         General: Normal range of motion.      Cervical back: Normal range of motion and neck supple.   Skin:     General: Skin is warm and dry.      Coloration: Skin is not jaundiced.   Neurological:      General: No focal deficit present.      Mental Status: He is alert and oriented to person, place, and time.   Psychiatric:         Mood and Affect: Mood normal.         Behavior: Behavior normal.         Thought Content: Thought content normal.         Judgment: Judgment normal.       Assessment and Plan    Diagnoses and all orders for this visit:    1. Elevated liver enzymes (Primary)  -     " Alpha - 1 - Antitrypsin  -     TERI  -     Anti-Smooth Muscle Antibody Titer  -     CBC & Differential  -     Comprehensive Metabolic Panel  -     Ferritin  -     Celiac Disease Panel  -     Ceruloplasmin  -     Gamma GT  -     Hepatitis Panel, Acute  -     IgG, IgA, IgM  -     Iron Profile  -     Mitochondrial Antibodies, M2  -     US Elastography Parenchyma; Future    2. Fatty liver  -     Alpha - 1 - Antitrypsin  -     TERI  -     Anti-Smooth Muscle Antibody Titer  -     CBC & Differential  -     Comprehensive Metabolic Panel  -     Ferritin  -     Celiac Disease Panel  -     Ceruloplasmin  -     Gamma GT  -     Hepatitis Panel, Acute  -     IgG, IgA, IgM  -     Iron Profile  -     Mitochondrial Antibodies, M2  -     US Elastography Parenchyma; Future    3. Gastroesophageal reflux disease, unspecified whether esophagitis present           Patient Instructions   1.  For further evaluation of elevated liver enzymes in the setting of fatty liver we will proceed with a full liver lab work-up as well as a FibroScan.  We will contact you with results of lab work once they have resulted.  You will be contacted to schedule your FibroScan and we will contact you with results once available.    2.  We recommend alcohol cessation.    3. For fatty liver, the recommended treatment is weight loss along with regular exercise, avoidance of foods containing high fructose corn syrup, and alcohol avoidance.  We also recommend daily supplementation with milk thistle.  This is an antioxidant that supports liver health and is available over-the-counter at your local grocery or pharmacy.    4.  For intermittent heartburn and reflux you may use Prilosec as needed.    5.  Additional recommendations pending outcome of lab work and imaging.    6.  Please be sure to follow-up with your PCP regarding further imaging on your kidney as discussed today during your office visit.     Discussion:    We will proceed with FibroScan on full liver lab  work-up for elevated liver enzymes.  Patient was strongly encouraged to stop drinking alcohol, as this could be a large contributing factor.  We have recommend daily supplementation with milk thistle.  For heartburn and reflux, patient may continue Prilosec as needed.  We will contact patient with results of lab work and FibroScan and make further recommendations at that time.  He will be due for his first screening colonoscopy at age 47, which we discussed today during his visit.  Next office follow-up to be determined based upon lab work and imaging.  Patient verbalized understand above plan of care and is in agreement.  All questions answered and support provided.     EMR Dragon/Transcription Disclaimer:  This document has been Dictated utilizing Dragon dictation.

## 2021-08-26 NOTE — PATIENT INSTRUCTIONS
1.  For further evaluation of elevated liver enzymes in the setting of fatty liver we will proceed with a full liver lab work-up as well as a FibroScan.  We will contact you with results of lab work once they have resulted.  You will be contacted to schedule your FibroScan and we will contact you with results once available.    2.  We recommend alcohol cessation.    3. For fatty liver, the recommended treatment is weight loss along with regular exercise, avoidance of foods containing high fructose corn syrup, and alcohol avoidance.  We also recommend daily supplementation with milk thistle.  This is an antioxidant that supports liver health and is available over-the-counter at your local grocery or pharmacy.    4.  For intermittent heartburn and reflux you may use Prilosec as needed.    5.  Additional recommendations pending outcome of lab work and imaging.    6.  Please be sure to follow-up with your PCP regarding further imaging on your kidney as discussed today during your office visit.

## 2021-09-02 ENCOUNTER — LAB (OUTPATIENT)
Dept: LAB | Facility: HOSPITAL | Age: 46
End: 2021-09-02

## 2021-09-02 LAB
ALBUMIN SERPL-MCNC: 5.1 G/DL (ref 3.5–5.2)
ALBUMIN/GLOB SERPL: 1.7 G/DL
ALP SERPL-CCNC: 117 U/L (ref 39–117)
ALPHA1 GLOB MFR UR ELPH: 157 MG/DL (ref 90–200)
ALT SERPL W P-5'-P-CCNC: 259 U/L (ref 1–41)
ANION GAP SERPL CALCULATED.3IONS-SCNC: 10.6 MMOL/L (ref 5–15)
AST SERPL-CCNC: 209 U/L (ref 1–40)
BASOPHILS # BLD AUTO: 0.03 10*3/MM3 (ref 0–0.2)
BASOPHILS NFR BLD AUTO: 0.5 % (ref 0–1.5)
BILIRUB SERPL-MCNC: 1.5 MG/DL (ref 0–1.2)
BUN SERPL-MCNC: 18 MG/DL (ref 6–20)
BUN/CREAT SERPL: 16.7 (ref 7–25)
CALCIUM SPEC-SCNC: 10.1 MG/DL (ref 8.6–10.5)
CERULOPLASMIN SERPL-MCNC: 25 MG/DL (ref 16–31)
CHLORIDE SERPL-SCNC: 98 MMOL/L (ref 98–107)
CO2 SERPL-SCNC: 27.4 MMOL/L (ref 22–29)
CREAT SERPL-MCNC: 1.08 MG/DL (ref 0.76–1.27)
DEPRECATED RDW RBC AUTO: 43.6 FL (ref 37–54)
EOSINOPHIL # BLD AUTO: 0.22 10*3/MM3 (ref 0–0.4)
EOSINOPHIL NFR BLD AUTO: 3.6 % (ref 0.3–6.2)
ERYTHROCYTE [DISTWIDTH] IN BLOOD BY AUTOMATED COUNT: 12.5 % (ref 12.3–15.4)
FERRITIN SERPL-MCNC: 712.8 NG/ML (ref 30–400)
GFR SERPL CREATININE-BSD FRML MDRD: 74 ML/MIN/1.73
GGT SERPL-CCNC: 580 U/L (ref 8–61)
GLOBULIN UR ELPH-MCNC: 3 GM/DL
GLUCOSE SERPL-MCNC: 118 MG/DL (ref 65–99)
HAV IGM SERPL QL IA: NORMAL
HBV CORE IGM SERPL QL IA: NORMAL
HBV SURFACE AG SERPL QL IA: NORMAL
HCT VFR BLD AUTO: 47.7 % (ref 37.5–51)
HCV AB SER DONR QL: NORMAL
HGB BLD-MCNC: 16.6 G/DL (ref 13–17.7)
IGA1 MFR SER: 237 MG/DL (ref 70–400)
IGG1 SER-MCNC: 896 MG/DL (ref 700–1600)
IGM SERPL-MCNC: 120 MG/DL (ref 40–230)
IMM GRANULOCYTES # BLD AUTO: 0.03 10*3/MM3 (ref 0–0.05)
IMM GRANULOCYTES NFR BLD AUTO: 0.5 % (ref 0–0.5)
IRON 24H UR-MRATE: 180 MCG/DL (ref 59–158)
IRON SATN MFR SERPL: 39 % (ref 20–50)
LYMPHOCYTES # BLD AUTO: 0.7 10*3/MM3 (ref 0.7–3.1)
LYMPHOCYTES NFR BLD AUTO: 11.4 % (ref 19.6–45.3)
MCH RBC QN AUTO: 32.6 PG (ref 26.6–33)
MCHC RBC AUTO-ENTMCNC: 34.8 G/DL (ref 31.5–35.7)
MCV RBC AUTO: 93.7 FL (ref 79–97)
MONOCYTES # BLD AUTO: 0.81 10*3/MM3 (ref 0.1–0.9)
MONOCYTES NFR BLD AUTO: 13.2 % (ref 5–12)
NEUTROPHILS NFR BLD AUTO: 4.33 10*3/MM3 (ref 1.7–7)
NEUTROPHILS NFR BLD AUTO: 70.8 % (ref 42.7–76)
NRBC BLD AUTO-RTO: 0 /100 WBC (ref 0–0.2)
PLATELET # BLD AUTO: 169 10*3/MM3 (ref 140–450)
PMV BLD AUTO: 9.7 FL (ref 6–12)
POTASSIUM SERPL-SCNC: 4.3 MMOL/L (ref 3.5–5.2)
PROT SERPL-MCNC: 8.1 G/DL (ref 6–8.5)
RBC # BLD AUTO: 5.09 10*6/MM3 (ref 4.14–5.8)
SODIUM SERPL-SCNC: 136 MMOL/L (ref 136–145)
TIBC SERPL-MCNC: 457 MCG/DL (ref 298–536)
TRANSFERRIN SERPL-MCNC: 307 MG/DL (ref 200–360)
WBC # BLD AUTO: 6.12 10*3/MM3 (ref 3.4–10.8)

## 2021-09-02 PROCEDURE — 83516 IMMUNOASSAY NONANTIBODY: CPT | Performed by: NURSE PRACTITIONER

## 2021-09-02 PROCEDURE — 86255 FLUORESCENT ANTIBODY SCREEN: CPT | Performed by: NURSE PRACTITIONER

## 2021-09-02 PROCEDURE — 84466 ASSAY OF TRANSFERRIN: CPT | Performed by: NURSE PRACTITIONER

## 2021-09-02 PROCEDURE — 82728 ASSAY OF FERRITIN: CPT | Performed by: NURSE PRACTITIONER

## 2021-09-02 PROCEDURE — 83540 ASSAY OF IRON: CPT | Performed by: NURSE PRACTITIONER

## 2021-09-02 PROCEDURE — 82784 ASSAY IGA/IGD/IGG/IGM EACH: CPT | Performed by: NURSE PRACTITIONER

## 2021-09-02 PROCEDURE — 85025 COMPLETE CBC W/AUTO DIFF WBC: CPT | Performed by: NURSE PRACTITIONER

## 2021-09-02 PROCEDURE — 36415 COLL VENOUS BLD VENIPUNCTURE: CPT | Performed by: NURSE PRACTITIONER

## 2021-09-02 PROCEDURE — 82977 ASSAY OF GGT: CPT | Performed by: NURSE PRACTITIONER

## 2021-09-02 PROCEDURE — 82390 ASSAY OF CERULOPLASMIN: CPT | Performed by: NURSE PRACTITIONER

## 2021-09-02 PROCEDURE — 86038 ANTINUCLEAR ANTIBODIES: CPT | Performed by: NURSE PRACTITIONER

## 2021-09-02 PROCEDURE — 80053 COMPREHEN METABOLIC PANEL: CPT | Performed by: NURSE PRACTITIONER

## 2021-09-02 PROCEDURE — 80074 ACUTE HEPATITIS PANEL: CPT | Performed by: NURSE PRACTITIONER

## 2021-09-02 PROCEDURE — 82103 ALPHA-1-ANTITRYPSIN TOTAL: CPT | Performed by: NURSE PRACTITIONER

## 2021-09-03 ENCOUNTER — HOSPITAL ENCOUNTER (OUTPATIENT)
Dept: ULTRASOUND IMAGING | Facility: HOSPITAL | Age: 46
Discharge: HOME | End: 2021-09-03
Admitting: NURSE PRACTITIONER

## 2021-09-03 DIAGNOSIS — R74.8 ELEVATED LIVER ENZYMES: Primary | ICD-10-CM

## 2021-09-03 DIAGNOSIS — K76.0 FATTY LIVER: ICD-10-CM

## 2021-09-03 DIAGNOSIS — R74.8 ELEVATED LIVER ENZYMES: ICD-10-CM

## 2021-09-03 LAB
ACTIN IGG SERPL-ACNC: 4 UNITS (ref 0–19)
ANA SER QL: NEGATIVE
ENDOMYSIUM IGA SER QL: NEGATIVE
IGA SERPL-MCNC: 233 MG/DL (ref 90–386)
MITOCHONDRIA M2 IGG SER-ACNC: <20 UNITS (ref 0–20)
TTG IGA SER-ACNC: <2 U/ML (ref 0–3)

## 2021-09-03 PROCEDURE — 76705 ECHO EXAM OF ABDOMEN: CPT

## 2021-09-03 PROCEDURE — 76981 USE PARENCHYMA: CPT

## 2021-09-07 ENCOUNTER — TELEPHONE (OUTPATIENT)
Dept: INTERNAL MEDICINE | Facility: CLINIC | Age: 46
End: 2021-09-07

## 2021-09-07 NOTE — TELEPHONE ENCOUNTER
Caller: Jefferson Chavez    Relationship: Self    Best call back number: [phone] (H)    What orders are you requesting (i.e. lab or imaging): LABS    In what timeframe would the patient need to come in:     Where will you receive your lab/imaging services: [facility]    Additional notes: PATIENT WOULD LIKE TO KNOW IF ORDERS ARE ON FILE

## 2021-09-07 NOTE — TELEPHONE ENCOUNTER
Pt was checking on additional orders from [facility] and inquiring if he could have those done here at out office. Pt advised to get them at [facility].

## 2021-09-09 DIAGNOSIS — K76.0 FATTY LIVER: Primary | ICD-10-CM

## 2021-09-09 DIAGNOSIS — N28.89 RIGHT RENAL MASS: ICD-10-CM

## 2021-09-18 ENCOUNTER — HOSPITAL ENCOUNTER (OUTPATIENT)
Dept: CT IMAGING | Facility: HOSPITAL | Age: 46
Discharge: HOME | End: 2021-09-18
Admitting: FAMILY MEDICINE

## 2021-09-18 DIAGNOSIS — N28.89 RIGHT RENAL MASS: ICD-10-CM

## 2021-09-18 DIAGNOSIS — K76.0 FATTY LIVER: ICD-10-CM

## 2021-09-18 PROCEDURE — 25010000002 IOPAMIDOL 61 % SOLUTION: Performed by: FAMILY MEDICINE

## 2021-09-18 PROCEDURE — 74178 CT ABD&PLV WO CNTR FLWD CNTR: CPT

## 2021-09-18 RX ADMIN — IOPAMIDOL 100 ML: 612 INJECTION, SOLUTION INTRAVENOUS at 10:39

## 2022-03-11 ENCOUNTER — TELEPHONE (OUTPATIENT)
Dept: INTERNAL MEDICINE | Facility: CLINIC | Age: 47
End: 2022-03-11

## 2022-03-11 NOTE — TELEPHONE ENCOUNTER
PATIENT CALLED WANTING AN APPOINTMENT TO DISCUSS STARTING BACK ON VYVANCE. HE HAS BEEN OFF OF IT FOR A WHILE. IT WORKED WELL FOR HIM.    HE HAS AN APPOINTMENT ON 5/27/22. TRIED TO MAKE AN OFFICE VISIT EARLIER AND NO APPOINTMENTS UNTIL MAY AS WELL.    HE WOULD LIKE TO SEE DR. HEBERT FOR THIS MEDICATION..    [id number] [address] [facility] - [phone] [facility] [phone]   [phone]    CALL BACK NUMBER [phone]

## 2022-03-14 ENCOUNTER — OFFICE VISIT (OUTPATIENT)
Dept: INTERNAL MEDICINE | Facility: CLINIC | Age: 47
End: 2022-03-14

## 2022-03-14 VITALS
DIASTOLIC BLOOD PRESSURE: 82 MMHG | WEIGHT: 205 LBS | BODY MASS INDEX: 26.31 KG/M2 | SYSTOLIC BLOOD PRESSURE: 138 MMHG | HEART RATE: 88 BPM | OXYGEN SATURATION: 98 % | TEMPERATURE: 96.8 F | HEIGHT: 74 IN

## 2022-03-14 DIAGNOSIS — R73.09 ELEVATED GLUCOSE: ICD-10-CM

## 2022-03-14 DIAGNOSIS — F41.9 ANXIETY: ICD-10-CM

## 2022-03-14 DIAGNOSIS — E78.00 ELEVATED CHOLESTEROL: ICD-10-CM

## 2022-03-14 DIAGNOSIS — F98.8 ATTENTION DEFICIT DISORDER (ADD) WITHOUT HYPERACTIVITY: Primary | ICD-10-CM

## 2022-03-14 DIAGNOSIS — R41.3 MEMORY DIFFICULTY: ICD-10-CM

## 2022-03-14 DIAGNOSIS — E78.2 MIXED HYPERLIPIDEMIA: ICD-10-CM

## 2022-03-14 DIAGNOSIS — K76.0 FATTY INFILTRATION OF LIVER: ICD-10-CM

## 2022-03-14 DIAGNOSIS — R74.8 ELEVATED LIVER ENZYMES: ICD-10-CM

## 2022-03-14 DIAGNOSIS — I10 PRIMARY HYPERTENSION: ICD-10-CM

## 2022-03-14 PROCEDURE — 99214 OFFICE O/P EST MOD 30 MIN: CPT | Performed by: FAMILY MEDICINE

## 2022-03-14 NOTE — PROGRESS NOTES
"Chief Complaint  ADD elevated liver enzymes    Subjective          Jefferson Chavez presents to [facility]  Wilfred is here to discuss going back on Vyvanse.  He has a different job which is moving a ways better although he works a lot from home.  He unstructured schedule has flared his ADD.  Will restart Vyvanse at 30 mg daily with precautions.    Also reviewed prior testing regarding elevated liver enzymes.  He is not drinking.  He is exercising his weight seems more stable.  Blood pressures controlled.  Medication includes losartan 25 mg daily.    Continues on sertraline 100 mg daily.  This is for anxiety without suicidal features or severe depression.          Objective   Vital Signs:   /82 (BP Location: Right arm, Patient Position: Sitting, Cuff Size: Adult)   Pulse 88   Temp 96.8 °F (36 °C)   Ht 188 cm (74\")   Wt 93 kg (205 lb)   SpO2 98%   BMI 26.32 kg/m²     Physical Exam  Vitals reviewed.   Constitutional:       Appearance: He is well-developed.   HENT:      Head: Normocephalic and atraumatic.      Right Ear: Tympanic membrane and external ear normal.      Left Ear: Tympanic membrane and external ear normal.   Eyes:      Conjunctiva/sclera: Conjunctivae normal.      Pupils: Pupils are equal, round, and reactive to light.   Neck:      Thyroid: No thyromegaly.      Vascular: No JVD.   Cardiovascular:      Rate and Rhythm: Normal rate and regular rhythm.      Heart sounds: Normal heart sounds.   Pulmonary:      Effort: Pulmonary effort is normal.      Breath sounds: Normal breath sounds.   Abdominal:      General: Bowel sounds are normal.      Palpations: Abdomen is soft.   Musculoskeletal:         General: Normal range of motion.      Cervical back: Normal range of motion and neck supple.   Lymphadenopathy:      Cervical: No cervical adenopathy.   Skin:     General: Skin is warm and dry.      Findings: No rash.   Neurological:      Mental Status: He is alert and oriented " to person, place, and time.      Cranial Nerves: No cranial nerve deficit.      Coordination: Coordination normal.   Psychiatric:         Behavior: Behavior normal.         Thought Content: Thought content normal.         Judgment: Judgment normal.        Result Review :   The following data was reviewed by: Parviz Mata MD on 03/14/2022:  Common labs    Common Labsle 7/9/21 7/9/21 7/9/21 7/9/21 9/2/21 9/2/21    0905 0905 0905 0905 0937 0937   Glucose  104 (A)    118 (A)   BUN  12    18   Creatinine  0.99    1.08   eGFR Non  Am  82    74   eGFR African Am  100       Sodium  141    136   Potassium  4.3    4.3   Chloride  101    98   Calcium  9.8    10.1   Total Protein  7.2       Albumin  5.00    5.10   Total Bilirubin  0.9    1.5 (A)   Alkaline Phosphatase  107    117   AST (SGOT)  192 (A)    209 (A)   ALT (SGPT)  222 (A)    259 (A)   WBC 5.53    6.12    Hemoglobin 15.7    16.6    Hematocrit 46.5    47.7    Platelets 185    169    Total Cholesterol   215 (A)      Triglycerides   129 144     HDL Cholesterol   64 (A)      LDL Cholesterol    128 (A)      (A) Abnormal value       Comments are available for some flowsheets but are not being displayed.           Data reviewed: Radiologic studies CT abdomen pelvis          Assessment and Plan    Diagnoses and all orders for this visit:    1. Attention deficit disorder (ADD) without hyperactivity (Primary)  Comments:  Vyvanse 30 mg daily    2. Anxiety  Comments:  Zoloft 100 mg daily    3. Primary hypertension  Comments:  Losartan 25 mg daily  Orders:  -     lisdexamfetamine (Vyvanse) 30 MG capsule; Take 1 capsule by mouth Every Morning  Dispense: 30 capsule; Refill: 0  -     CBC & Differential  -     Comprehensive Metabolic Panel  -     Cancel: Hemoglobin A1c  -     Lipid Panel With / Chol / HDL Ratio  -     Gamma GT  -     TSH  -     T4, Free  -     Vitamin B12  -     Urinalysis With Microscopic If Indicated (No Culture) - Urine, Clean Catch  -     Folate    4.  Mixed hyperlipidemia  -     lisdexamfetamine (Vyvanse) 30 MG capsule; Take 1 capsule by mouth Every Morning  Dispense: 30 capsule; Refill: 0  -     CBC & Differential  -     Comprehensive Metabolic Panel  -     Cancel: Hemoglobin A1c  -     Lipid Panel With / Chol / HDL Ratio  -     Gamma GT  -     TSH  -     T4, Free  -     Vitamin B12  -     Urinalysis With Microscopic If Indicated (No Culture) - Urine, Clean Catch  -     Folate    5. Memory difficulty  -     lisdexamfetamine (Vyvanse) 30 MG capsule; Take 1 capsule by mouth Every Morning  Dispense: 30 capsule; Refill: 0  -     CBC & Differential  -     Comprehensive Metabolic Panel  -     Cancel: Hemoglobin A1c  -     Lipid Panel With / Chol / HDL Ratio  -     Gamma GT  -     TSH  -     T4, Free  -     Vitamin B12  -     Urinalysis With Microscopic If Indicated (No Culture) - Urine, Clean Catch  -     Folate    6. Fatty infiltration of liver  -     lisdexamfetamine (Vyvanse) 30 MG capsule; Take 1 capsule by mouth Every Morning  Dispense: 30 capsule; Refill: 0  -     CBC & Differential  -     Comprehensive Metabolic Panel  -     Cancel: Hemoglobin A1c  -     Lipid Panel With / Chol / HDL Ratio  -     Gamma GT  -     TSH  -     T4, Free  -     Vitamin B12  -     Urinalysis With Microscopic If Indicated (No Culture) - Urine, Clean Catch  -     Folate    7. Elevated liver enzymes  Comments:  Presumed fatty liver.  Recheck labs.  Orders:  -     lisdexamfetamine (Vyvanse) 30 MG capsule; Take 1 capsule by mouth Every Morning  Dispense: 30 capsule; Refill: 0  -     CBC & Differential  -     Comprehensive Metabolic Panel  -     Cancel: Hemoglobin A1c  -     Lipid Panel With / Chol / HDL Ratio  -     Gamma GT  -     TSH  -     T4, Free  -     Vitamin B12  -     Urinalysis With Microscopic If Indicated (No Culture) - Urine, Clean Catch  -     Folate    8. Elevated cholesterol  Comments:  Lipid panel pending  Orders:  -     lisdexamfetamine (Vyvanse) 30 MG capsule; Take 1  capsule by mouth Every Morning  Dispense: 30 capsule; Refill: 0  -     CBC & Differential  -     Comprehensive Metabolic Panel  -     Cancel: Hemoglobin A1c  -     Lipid Panel With / Chol / HDL Ratio  -     Gamma GT  -     TSH  -     T4, Free  -     Vitamin B12  -     Urinalysis With Microscopic If Indicated (No Culture) - Urine, Clean Catch  -     Folate    9. Elevated glucose  -     Hemoglobin A1c        Follow Up   Return in about 2 months (around 5/27/2022) for Annual physical.  Patient was given instructions and counseling regarding his condition or for health maintenance advice. Please see specific information pulled into the AVS if appropriate.

## 2022-03-15 ENCOUNTER — TELEPHONE (OUTPATIENT)
Dept: INTERNAL MEDICINE | Facility: CLINIC | Age: 47
End: 2022-03-15

## 2022-03-15 NOTE — TELEPHONE ENCOUNTER
Caller: Jefferson Chavez    Relationship: Self    Best call back number: [phone] (H)    What test/procedure requested: lisdexamfetamine (Vyvanse) 30 MG capsule    When is it needed: ASAP    Where is the test/procedure going to be performed: [id number] [place] [address] [address] AT [facility] [phone] [facility] [phone] FX     Additional information or concerns: PATIENT STATES THE PHARMACY TOLD HIM THEY ARE WAITING ON A FAX BACK FROM DR HEBERT REGARDING THE INSURANCE, PLEASE ADVISE PATIENT ASAP

## 2022-03-16 NOTE — TELEPHONE ENCOUNTER
I called pharm and it requires a PA    Member ID# [id number]  BIN  [id number]  PCN [id number]  [id number]  Group # [id number]

## 2022-05-16 DIAGNOSIS — R74.8 ELEVATED LIVER ENZYMES: ICD-10-CM

## 2022-05-16 DIAGNOSIS — E78.00 ELEVATED CHOLESTEROL: ICD-10-CM

## 2022-05-16 DIAGNOSIS — E78.2 MIXED HYPERLIPIDEMIA: ICD-10-CM

## 2022-05-16 DIAGNOSIS — K76.0 FATTY INFILTRATION OF LIVER: ICD-10-CM

## 2022-05-16 DIAGNOSIS — I10 PRIMARY HYPERTENSION: ICD-10-CM

## 2022-05-16 DIAGNOSIS — R41.3 MEMORY DIFFICULTY: ICD-10-CM

## 2022-05-16 NOTE — TELEPHONE ENCOUNTER
Caller: Jefferson Chavez    Relationship: Self    Best call back number: [phone]  Requested Prescriptions:   Requested Prescriptions     Pending Prescriptions Disp Refills   • lisdexamfetamine (Vyvanse) 30 MG capsule 30 capsule 0     Sig: Take 1 capsule by mouth Every Morning        Pharmacy where request should be sent: [facility] #[id number] [place] [address] [address] AT [facility] [phone] [facility] [phone] FX     Additional details provided by patient: PATIENT IS OUT OF THIS MEDICATION. LOV 3/14/22. NOV 5/27/22    Does the patient have less than a 3 day supply:  [x] Yes  [] No    Josué Lilly Rep   05/16/22 11:28 EDT

## 2022-05-21 LAB
ALBUMIN SERPL-MCNC: 4.7 G/DL (ref 4–5)
ALBUMIN/GLOB SERPL: 2 {RATIO} (ref 1.2–2.2)
ALP SERPL-CCNC: 114 IU/L (ref 44–121)
ALT SERPL-CCNC: 250 IU/L (ref 0–44)
APPEARANCE UR: CLEAR
AST SERPL-CCNC: 167 IU/L (ref 0–40)
BACTERIA #/AREA URNS HPF: NORMAL /[HPF]
BASOPHILS # BLD AUTO: NORMAL 10*3/UL
BILIRUB SERPL-MCNC: 1.1 MG/DL (ref 0–1.2)
BILIRUB UR QL STRIP: NEGATIVE
BUN SERPL-MCNC: 12 MG/DL (ref 6–24)
BUN/CREAT SERPL: 11 (ref 9–20)
CALCIUM SERPL-MCNC: 9.6 MG/DL (ref 8.7–10.2)
CASTS URNS QL MICRO: NORMAL /LPF
CHLORIDE SERPL-SCNC: 98 MMOL/L (ref 96–106)
CHOLEST SERPL-MCNC: 313 MG/DL (ref 100–199)
CHOLEST/HDLC SERPL: 4.7 RATIO (ref 0–5)
CO2 SERPL-SCNC: 25 MMOL/L (ref 20–29)
COLOR UR: YELLOW
CREAT SERPL-MCNC: 1.05 MG/DL (ref 0.76–1.27)
EGFRCR SERPLBLD CKD-EPI 2021: 89 ML/MIN/1.73
EOSINOPHIL # BLD AUTO: NORMAL 10*3/UL
EOSINOPHIL NFR BLD AUTO: NORMAL %
EPI CELLS #/AREA URNS HPF: NORMAL /HPF (ref 0–10)
FOLATE SERPL-MCNC: 5.8 NG/ML
GGT SERPL-CCNC: 665 IU/L (ref 0–65)
GLOBULIN SER CALC-MCNC: 2.4 G/DL (ref 1.5–4.5)
GLUCOSE SERPL-MCNC: 106 MG/DL (ref 65–99)
GLUCOSE UR QL STRIP: NEGATIVE
HBA1C MFR BLD: 5.6 % (ref 4.8–5.6)
HBA1C MFR BLD: NORMAL %
HCT VFR BLD AUTO: NORMAL %
HDLC SERPL-MCNC: 66 MG/DL
HGB BLD-MCNC: NORMAL G/DL
HGB UR QL STRIP: NEGATIVE
KETONES UR QL STRIP: ABNORMAL
LDLC SERPL CALC-MCNC: 215 MG/DL (ref 0–99)
LEUKOCYTE ESTERASE UR QL STRIP: NEGATIVE
LYMPHOCYTES # BLD AUTO: NORMAL 10*3/UL
LYMPHOCYTES NFR BLD AUTO: NORMAL %
MICRO URNS: ABNORMAL
MONOCYTES NFR BLD AUTO: NORMAL %
NEUTROPHILS NFR BLD AUTO: NORMAL %
NITRITE UR QL STRIP: NEGATIVE
PH UR STRIP: 6.5 [PH] (ref 5–7.5)
PLATELET # BLD AUTO: NORMAL 10*3/UL
POTASSIUM SERPL-SCNC: 4.3 MMOL/L (ref 3.5–5.2)
PROT SERPL-MCNC: 7.1 G/DL (ref 6–8.5)
PROT UR QL STRIP: ABNORMAL
RBC # BLD AUTO: NORMAL 10*6/UL
RBC #/AREA URNS HPF: NORMAL /HPF (ref 0–2)
REQUEST PROBLEM: NORMAL
SODIUM SERPL-SCNC: 139 MMOL/L (ref 134–144)
SP GR UR STRIP: 1.02 (ref 1–1.03)
T4 FREE SERPL-MCNC: 0.91 NG/DL (ref 0.82–1.77)
TRIGL SERPL-MCNC: 171 MG/DL (ref 0–149)
TSH SERPL DL<=0.005 MIU/L-ACNC: 4.73 UIU/ML (ref 0.45–4.5)
UROBILINOGEN UR STRIP-MCNC: 1 MG/DL (ref 0.2–1)
VIT B12 SERPL-MCNC: 639 PG/ML (ref 232–1245)
VLDLC SERPL CALC-MCNC: 32 MG/DL (ref 5–40)
WBC # BLD AUTO: NORMAL X10E3/UL
WBC #/AREA URNS HPF: NORMAL /HPF (ref 0–5)

## 2022-05-22 LAB
BASOPHILS # BLD AUTO: 0.1 X10E3/UL (ref 0–0.2)
BASOPHILS NFR BLD AUTO: 1 %
EOSINOPHIL # BLD AUTO: 0.2 X10E3/UL (ref 0–0.4)
EOSINOPHIL NFR BLD AUTO: 2 %
ERYTHROCYTE [DISTWIDTH] IN BLOOD BY AUTOMATED COUNT: 14 % (ref 11.6–15.4)
HCT VFR BLD AUTO: 41.2 % (ref 37.5–51)
HGB BLD-MCNC: 13.2 G/DL (ref 13–17.7)
IMM GRANULOCYTES # BLD AUTO: 0.1 X10E3/UL (ref 0–0.1)
IMM GRANULOCYTES NFR BLD AUTO: 1 %
LYMPHOCYTES # BLD AUTO: 3.9 X10E3/UL (ref 0.7–3.1)
LYMPHOCYTES NFR BLD AUTO: 36 %
MCH RBC QN AUTO: 27.4 PG (ref 26.6–33)
MCHC RBC AUTO-ENTMCNC: 32 G/DL (ref 31.5–35.7)
MCV RBC AUTO: 86 FL (ref 79–97)
MONOCYTES # BLD AUTO: 0.5 X10E3/UL (ref 0.1–0.9)
MONOCYTES NFR BLD AUTO: 4 %
NEUTROPHILS # BLD AUTO: 6.2 X10E3/UL (ref 1.4–7)
NEUTROPHILS NFR BLD AUTO: 56 %
PLATELET # BLD AUTO: 248 X10E3/UL (ref 150–450)
RBC # BLD AUTO: 4.81 X10E6/UL (ref 4.14–5.8)
WBC # BLD AUTO: 10.9 X10E3/UL (ref 3.4–10.8)
WRITTEN AUTHORIZATION: NORMAL

## 2022-05-27 ENCOUNTER — OFFICE VISIT (OUTPATIENT)
Dept: INTERNAL MEDICINE | Facility: CLINIC | Age: 47
End: 2022-05-27

## 2022-05-27 VITALS
OXYGEN SATURATION: 98 % | DIASTOLIC BLOOD PRESSURE: 98 MMHG | SYSTOLIC BLOOD PRESSURE: 146 MMHG | HEART RATE: 84 BPM | WEIGHT: 200 LBS | TEMPERATURE: 97.7 F | BODY MASS INDEX: 25.68 KG/M2

## 2022-05-27 DIAGNOSIS — K76.0 FATTY INFILTRATION OF LIVER: ICD-10-CM

## 2022-05-27 DIAGNOSIS — I10 PRIMARY HYPERTENSION: ICD-10-CM

## 2022-05-27 DIAGNOSIS — R74.8 ELEVATED LIVER ENZYMES: Primary | ICD-10-CM

## 2022-05-27 DIAGNOSIS — E78.00 HYPERCHOLESTEROLEMIA: ICD-10-CM

## 2022-05-27 DIAGNOSIS — F10.90 ALCOHOL INTAKE ABOVE RECOMMENDED SENSIBLE LIMITS: ICD-10-CM

## 2022-05-27 DIAGNOSIS — R74.8 ELEVATED LIVER ENZYMES: ICD-10-CM

## 2022-05-27 DIAGNOSIS — E78.2 MIXED HYPERLIPIDEMIA: ICD-10-CM

## 2022-05-27 DIAGNOSIS — E78.00 ELEVATED CHOLESTEROL: ICD-10-CM

## 2022-05-27 DIAGNOSIS — F90.2 ATTENTION DEFICIT HYPERACTIVITY DISORDER (ADHD), COMBINED TYPE: ICD-10-CM

## 2022-05-27 DIAGNOSIS — R41.3 MEMORY DIFFICULTY: ICD-10-CM

## 2022-05-27 PROBLEM — Z00.00 ANNUAL PHYSICAL EXAM: Status: ACTIVE | Noted: 2022-05-27

## 2022-05-27 PROCEDURE — 99396 PREV VISIT EST AGE 40-64: CPT | Performed by: FAMILY MEDICINE

## 2022-05-27 RX ORDER — ROSUVASTATIN CALCIUM 5 MG/1
TABLET, COATED ORAL
COMMUNITY
Start: 2022-05-19 | End: 2023-02-20

## 2022-05-27 RX ORDER — SERTRALINE HYDROCHLORIDE 100 MG/1
100 TABLET, FILM COATED ORAL DAILY
Qty: 90 TABLET | Refills: 3 | Status: SHIPPED | OUTPATIENT
Start: 2022-05-27

## 2022-05-27 RX ORDER — LOSARTAN POTASSIUM 25 MG/1
25 TABLET ORAL DAILY
Qty: 90 TABLET | Refills: 3 | Status: SHIPPED | OUTPATIENT
Start: 2022-05-27

## 2022-05-27 NOTE — ASSESSMENT & PLAN NOTE
Long discussion about stopping drinking milk thistle extract for liver prevention as advocated by gastroenterology as well as diet exercise.  Otherwise he is off Crestor at the time.  We will have him come back for repeat labs in about 3 months off Crestor.  Confer with gastroenterology about restarting Crestor.

## 2022-05-27 NOTE — PROGRESS NOTES
Chief Complaint  Annual Exam    Subjective          Jefferson Chavez presents to [facility]  Patient here for physical.  Long discussion about liver health elevated liver enzymes elevated cholesterol cardiovascular wellness.  He agrees will get back on a better diet with exercise and stop drinking.  Otherwise hold off on Crestor recheck liver enzymes plus cholesterol panel in 3 months.  Confer with gastroenterology about the restarting of Crestor.  He has had negative hepatitis panel in the past.  CT of his abdomen and pelvis are reviewed with him.  He has fatty liver infiltration secondary to pure fatty liver hypercholesterolemia and most likely drinking alcohol as well.      Objective   Vital Signs:  /98 (BP Location: Left arm, Patient Position: Sitting, Cuff Size: Adult)   Pulse 84   Temp 97.7 °F (36.5 °C) (Tympanic)   Wt 90.7 kg (200 lb)   SpO2 98%   BMI 25.68 kg/m²   BMI has not been calculated during today's encounter.       Physical Exam  Vitals reviewed.   Constitutional:       Appearance: He is well-developed.   HENT:      Head: Normocephalic and atraumatic.      Right Ear: Tympanic membrane and external ear normal.      Left Ear: Tympanic membrane and external ear normal.   Eyes:      Conjunctiva/sclera: Conjunctivae normal.      Pupils: Pupils are equal, round, and reactive to light.   Neck:      Thyroid: No thyromegaly.      Vascular: No JVD.   Cardiovascular:      Rate and Rhythm: Normal rate and regular rhythm.      Heart sounds: Normal heart sounds.   Pulmonary:      Effort: Pulmonary effort is normal.      Breath sounds: Normal breath sounds.   Abdominal:      General: Bowel sounds are normal.      Palpations: Abdomen is soft.   Musculoskeletal:         General: Normal range of motion.      Cervical back: Normal range of motion and neck supple.   Lymphadenopathy:      Cervical: No cervical adenopathy.   Skin:     General: Skin is warm and dry.      Findings: No  rash.   Neurological:      Mental Status: He is alert and oriented to person, place, and time.      Cranial Nerves: No cranial nerve deficit.      Coordination: Coordination normal.   Psychiatric:         Behavior: Behavior normal.         Thought Content: Thought content normal.         Judgment: Judgment normal.        Result Review :                 Assessment and Plan    Diagnoses and all orders for this visit:    1. Elevated liver enzymes (Primary)  Assessment & Plan:  Long discussion about stopping drinking milk thistle extract for liver prevention as advocated by gastroenterology as well as diet exercise.  Otherwise he is off Crestor at the time.  We will have him come back for repeat labs in about 3 months off Crestor.  Confer with gastroenterology about restarting Crestor.    Orders:  -     Urinalysis With Microscopic If Indicated (No Culture) - Urine, Clean Catch; Future  -     TSH; Future  -     Lipid Panel With / Chol / HDL Ratio; Future  -     CBC & Differential; Future  -     Comprehensive Metabolic Panel; Future  -     Hemoglobin A1c; Future  -     Gamma GT; Future    2. Hypercholesterolemia  Assessment & Plan:  Lipid abnormalities are worsening.  Nutritional counseling was provided. and Lipid-lowering therapy was not prescribed due to Monitoring liver enzymes.  Lipids will be reassessed in 3 months.Long discussion about stopping drinking milk thistle extract for liver prevention as advocated by gastroenterology as well as diet exercise.  Otherwise he is off Crestor at the time.  We will have him come back for repeat labs in about 3 months off Crestor.  Confer with gastroenterology about restarting Crestor.    Orders:  -     Urinalysis With Microscopic If Indicated (No Culture) - Urine, Clean Catch; Future  -     TSH; Future  -     Lipid Panel With / Chol / HDL Ratio; Future  -     CBC & Differential; Future  -     Comprehensive Metabolic Panel; Future  -     Hemoglobin A1c; Future  -     Gamma GT;  Future    3. Primary hypertension  Assessment & Plan:  Hypertension is improving with treatment.  Continue current treatment regimen.  Blood pressure will be reassessed in 3 months.    Orders:  -     Urinalysis With Microscopic If Indicated (No Culture) - Urine, Clean Catch; Future  -     TSH; Future  -     Lipid Panel With / Chol / HDL Ratio; Future  -     CBC & Differential; Future  -     Comprehensive Metabolic Panel; Future  -     Hemoglobin A1c; Future  -     Gamma GT; Future    4. Attention deficit hyperactivity disorder (ADHD), combined type  Assessment & Plan:  Psychological condition is improving with treatment.  Continue current treatment regimen.  Psychological condition  will be reassessed in 3 months.  Continue current dose of Vyvanse 30 mg daily      5. Alcohol intake above recommended sensible limits  -     Urinalysis With Microscopic If Indicated (No Culture) - Urine, Clean Catch; Future  -     TSH; Future  -     Lipid Panel With / Chol / HDL Ratio; Future  -     CBC & Differential; Future  -     Comprehensive Metabolic Panel; Future  -     Hemoglobin A1c; Future  -     Gamma GT; Future    6. Primary hypertension  Comments:  Losartan 25 mg daily  Assessment & Plan:  Hypertension is improving with treatment.  Continue current treatment regimen.  Blood pressure will be reassessed in 3 months.    Orders:  -     Urinalysis With Microscopic If Indicated (No Culture) - Urine, Clean Catch; Future  -     TSH; Future  -     Lipid Panel With / Chol / HDL Ratio; Future  -     CBC & Differential; Future  -     Comprehensive Metabolic Panel; Future  -     Hemoglobin A1c; Future  -     Gamma GT; Future    7. Mixed hyperlipidemia    8. Memory difficulty    9. Fatty infiltration of liver    10. Elevated liver enzymes  Comments:  Presumed fatty liver.  Recheck labs.  Assessment & Plan:  Long discussion about stopping drinking milk thistle extract for liver prevention as advocated by gastroenterology as well as diet  exercise.  Otherwise he is off Crestor at the time.  We will have him come back for repeat labs in about 3 months off Crestor.  Confer with gastroenterology about restarting Crestor.    Orders:  -     Urinalysis With Microscopic If Indicated (No Culture) - Urine, Clean Catch; Future  -     TSH; Future  -     Lipid Panel With / Chol / HDL Ratio; Future  -     CBC & Differential; Future  -     Comprehensive Metabolic Panel; Future  -     Hemoglobin A1c; Future  -     Gamma GT; Future    11. Elevated cholesterol  Comments:  Lipid panel pending    Other orders  -     sertraline (ZOLOFT) 100 MG tablet; Take 1 tablet by mouth Daily.  Dispense: 90 tablet; Refill: 3  -     losartan (Cozaar) 25 MG tablet; Take 1 tablet by mouth Daily.  Dispense: 90 tablet; Refill: 3           Follow Up   Return in about 3 months (around 8/27/2022) for Recheck.  Patient was given instructions and counseling regarding his condition or for health maintenance advice. Please see specific information pulled into the AVS if appropriate.

## 2022-05-27 NOTE — ASSESSMENT & PLAN NOTE
Lipid abnormalities are worsening.  Nutritional counseling was provided. and Lipid-lowering therapy was not prescribed due to Monitoring liver enzymes.  Lipids will be reassessed in 3 months.Long discussion about stopping drinking milk thistle extract for liver prevention as advocated by gastroenterology as well as diet exercise.  Otherwise he is off Crestor at the time.  We will have him come back for repeat labs in about 3 months off Crestor.  Confer with gastroenterology about restarting Crestor.

## 2022-05-27 NOTE — ASSESSMENT & PLAN NOTE
Psychological condition is improving with treatment.  Continue current treatment regimen.  Psychological condition  will be reassessed in 3 months.  Continue current dose of Vyvanse 30 mg daily

## 2022-08-17 ENCOUNTER — TELEPHONE (OUTPATIENT)
Dept: INTERNAL MEDICINE | Facility: CLINIC | Age: 47
End: 2022-08-17

## 2022-08-17 DIAGNOSIS — K76.0 FATTY INFILTRATION OF LIVER: ICD-10-CM

## 2022-08-17 DIAGNOSIS — R74.8 ELEVATED LIVER ENZYMES: ICD-10-CM

## 2022-08-17 DIAGNOSIS — E78.00 ELEVATED CHOLESTEROL: ICD-10-CM

## 2022-08-17 DIAGNOSIS — E78.2 MIXED HYPERLIPIDEMIA: ICD-10-CM

## 2022-08-17 DIAGNOSIS — I10 PRIMARY HYPERTENSION: ICD-10-CM

## 2022-08-17 DIAGNOSIS — R41.3 MEMORY DIFFICULTY: ICD-10-CM

## 2022-08-17 NOTE — TELEPHONE ENCOUNTER
Caller: KathyJefferson    Relationship: Self    Best call back number:[phone]    Requested Prescriptions:   Requested Prescriptions     Pending Prescriptions Disp Refills   • lisdexamfetamine (Vyvanse) 30 MG capsule 30 capsule 0     Sig: Take 1 capsule by mouth Every Morning        Pharmacy where request should be sent: [facility] #[id number] [place] [address] [address] AT [facility] [phone] [facility] [phone] FX     Additional details provided by patient: PATIENT COMPLETELY OUT    Does the patient have less than a 3 day supply:  [x] Yes  [] No    Josué Roche Rep   08/17/22 09:33 EDT

## 2022-08-17 NOTE — TELEPHONE ENCOUNTER
Caller: Jefferson Chavez    Relationship to patient: Self    Best call back number: [phone]    Chief complaint: N/A    Type of visit: LAB    Requested date: WEEK BEFORE NOV 8    If rescheduling, when is the original appointment:08/24    Additional notes:ATTEMPTED A WARM TRANSFER,UNSUCCESSFUL.    PATIENT NEEDS TO RESCHEDULE APPT

## 2022-09-20 DIAGNOSIS — I10 PRIMARY HYPERTENSION: ICD-10-CM

## 2022-09-20 DIAGNOSIS — R41.3 MEMORY DIFFICULTY: ICD-10-CM

## 2022-09-20 DIAGNOSIS — E78.2 MIXED HYPERLIPIDEMIA: ICD-10-CM

## 2022-09-20 DIAGNOSIS — R74.8 ELEVATED LIVER ENZYMES: ICD-10-CM

## 2022-09-20 DIAGNOSIS — E78.00 ELEVATED CHOLESTEROL: ICD-10-CM

## 2022-09-20 DIAGNOSIS — K76.0 FATTY INFILTRATION OF LIVER: ICD-10-CM

## 2022-09-20 NOTE — TELEPHONE ENCOUNTER
Caller: Wilfred Chavezua    Relationship: Self    Best call back number: [phone]    Requested Prescriptions:   Requested Prescriptions     Pending Prescriptions Disp Refills   • lisdexamfetamine (Vyvanse) 30 MG capsule 30 capsule 0     Sig: Take 1 capsule by mouth Every Morning        Pharmacy where request should be sent: [facility] #[id number] [place] [address] [address]  AT [facility] - [phone]  - [phone] FX     Additional details provided by patient: ONE DAY LEFT.     Does the patient have less than a 3 day supply:  [x] Yes  [] No    Josué RAMAN Rep   09/20/22 12:35 EDT

## 2022-10-25 ENCOUNTER — TELEPHONE (OUTPATIENT)
Dept: INTERNAL MEDICINE | Facility: CLINIC | Age: 47
End: 2022-10-25

## 2022-10-25 DIAGNOSIS — R41.3 MEMORY DIFFICULTY: ICD-10-CM

## 2022-10-25 DIAGNOSIS — K76.0 FATTY INFILTRATION OF LIVER: ICD-10-CM

## 2022-10-25 DIAGNOSIS — E78.00 ELEVATED CHOLESTEROL: ICD-10-CM

## 2022-10-25 DIAGNOSIS — R74.8 ELEVATED LIVER ENZYMES: ICD-10-CM

## 2022-10-25 DIAGNOSIS — E78.2 MIXED HYPERLIPIDEMIA: ICD-10-CM

## 2022-10-25 DIAGNOSIS — I10 PRIMARY HYPERTENSION: ICD-10-CM

## 2022-10-25 NOTE — TELEPHONE ENCOUNTER
Hub staff attempted to follow warm transfer process and was unsuccessful     Caller: Jefferson Chavez    Relationship to patient: Self    Best call back number: [phone]    Patient is needing: PLEASE CALL PATIENT TO RESCHEDULE LABS

## 2022-10-25 NOTE — TELEPHONE ENCOUNTER
Caller: Jefferson Chavez    Relationship: Self    Best call back number: [phone]    Requested Prescriptions:   Requested Prescriptions     Pending Prescriptions Disp Refills   • lisdexamfetamine (Vyvanse) 30 MG capsule 30 capsule 0     Sig: Take 1 capsule by mouth Every Morning        Pharmacy where request should be sent: [facility] #[id number] [place] [address] [address] AT [facility] [address]  - [phone]  - [phone] FX     Additional details provided by patient: PATIENT HAS ONE DAY LEFT OF MEDICATION    Does the patient have less than a 3 day supply:  [x] Yes  [] No    Josué Plasencia Rep   10/25/22 14:19 EDT

## 2022-11-02 DIAGNOSIS — E78.2 MIXED HYPERLIPIDEMIA: ICD-10-CM

## 2022-11-02 DIAGNOSIS — K76.0 FATTY INFILTRATION OF LIVER: ICD-10-CM

## 2022-11-02 DIAGNOSIS — R74.8 ELEVATED LIVER ENZYMES: ICD-10-CM

## 2022-11-02 DIAGNOSIS — E78.00 ELEVATED CHOLESTEROL: ICD-10-CM

## 2022-11-02 DIAGNOSIS — I10 PRIMARY HYPERTENSION: ICD-10-CM

## 2022-11-02 DIAGNOSIS — R41.3 MEMORY DIFFICULTY: ICD-10-CM

## 2022-11-02 NOTE — TELEPHONE ENCOUNTER
Caller: Jefferson Chavez    Relationship: Self    Best call back number: [phone]    Requested Prescriptions:   Requested Prescriptions     Pending Prescriptions Disp Refills   • lisdexamfetamine (Vyvanse) 30 MG capsule 30 capsule 0     Sig: Take 1 capsule by mouth Every Morning        Pharmacy where request should be sent: [facility] #[id number] [place] [address] [address]  [facility]  - [phone]  - [phone] FX     Additional details provided by patient: MADE APPOINTMENT WITH DR. HEBERT FIRST AVAILABLE   ON 12/9/22    Does the patient have less than a 3 day supply:  [x] Yes  [] No    Mariluz Jarrett, Josué Rep   11/02/22 12:12 EDT

## 2022-11-08 DIAGNOSIS — R73.09 ELEVATED GLUCOSE: ICD-10-CM

## 2022-11-08 DIAGNOSIS — F10.90 ALCOHOL INTAKE ABOVE RECOMMENDED SENSIBLE LIMITS: ICD-10-CM

## 2022-11-08 DIAGNOSIS — E78.00 ELEVATED CHOLESTEROL: ICD-10-CM

## 2022-11-08 DIAGNOSIS — I10 PRIMARY HYPERTENSION: ICD-10-CM

## 2022-11-08 DIAGNOSIS — R74.8 ELEVATED LIVER ENZYMES: Primary | ICD-10-CM

## 2022-12-09 ENCOUNTER — OFFICE VISIT (OUTPATIENT)
Dept: INTERNAL MEDICINE | Facility: CLINIC | Age: 47
End: 2022-12-09

## 2022-12-09 VITALS
WEIGHT: 201 LBS | SYSTOLIC BLOOD PRESSURE: 146 MMHG | BODY MASS INDEX: 25.81 KG/M2 | DIASTOLIC BLOOD PRESSURE: 82 MMHG | TEMPERATURE: 97.7 F | OXYGEN SATURATION: 96 % | HEART RATE: 85 BPM

## 2022-12-09 DIAGNOSIS — F41.9 ANXIETY: ICD-10-CM

## 2022-12-09 DIAGNOSIS — I10 PRIMARY HYPERTENSION: Primary | ICD-10-CM

## 2022-12-09 DIAGNOSIS — F90.2 ATTENTION DEFICIT HYPERACTIVITY DISORDER (ADHD), COMBINED TYPE: ICD-10-CM

## 2022-12-09 DIAGNOSIS — R74.8 ELEVATED LIVER ENZYMES: ICD-10-CM

## 2022-12-09 DIAGNOSIS — E78.00 HYPERCHOLESTEROLEMIA: ICD-10-CM

## 2022-12-09 PROCEDURE — 99214 OFFICE O/P EST MOD 30 MIN: CPT | Performed by: FAMILY MEDICINE

## 2022-12-09 NOTE — PROGRESS NOTES
"Chief Complaint  Hyperlipidemia, Anxiety, and ADHD    Subjective        Jefferson Chavez presents to [facility]  History of Present Illness  Wilfred is here for follow-up of ADHD ADD and really needs increased dose of Vyvanse to 40 mg which will fill.    Liver enzymes have been up and down with presumption of fatty liver.  He does have genetically high cholesterol and high LDL.  HDL is pretty high as well.  We will get resumption of rosuvastatin 5 mg daily with repeat labs prior to visit on 10 January for physical.    Treatment of blood pressure continues with losartan 25 mg daily.    Otherwise takes sertraline 100 mg daily for anxiety which is working.  Hyperlipidemia    Anxiety            Objective   Vital Signs:  /82 (BP Location: Left arm, Patient Position: Sitting, Cuff Size: Adult)   Pulse 85   Temp 97.7 °F (36.5 °C) (Tympanic)   Wt 91.2 kg (201 lb)   SpO2 96%   BMI 25.81 kg/m²   Estimated body mass index is 25.81 kg/m² as calculated from the following:    Height as of 3/14/22: 188 cm (74\").    Weight as of this encounter: 91.2 kg (201 lb).          Physical Exam  Vitals reviewed.   Constitutional:       Appearance: He is well-developed.   HENT:      Head: Normocephalic and atraumatic.      Right Ear: Tympanic membrane and external ear normal.      Left Ear: Tympanic membrane and external ear normal.      Nose: Nose normal.   Eyes:      Conjunctiva/sclera: Conjunctivae normal.      Pupils: Pupils are equal, round, and reactive to light.   Neck:      Thyroid: No thyromegaly.      Vascular: No JVD.   Cardiovascular:      Rate and Rhythm: Normal rate and regular rhythm.      Heart sounds: Normal heart sounds.   Pulmonary:      Effort: Pulmonary effort is normal.      Breath sounds: Normal breath sounds.   Abdominal:      General: Bowel sounds are normal.      Palpations: Abdomen is soft.   Musculoskeletal:         General: Normal range of motion.      Cervical back: Normal " range of motion and neck supple.   Lymphadenopathy:      Cervical: No cervical adenopathy.   Skin:     General: Skin is warm and dry.      Findings: No rash.   Neurological:      Mental Status: He is alert and oriented to person, place, and time.      Cranial Nerves: No cranial nerve deficit.      Coordination: Coordination normal.   Psychiatric:         Behavior: Behavior normal.         Thought Content: Thought content normal.         Judgment: Judgment normal.        Result Review :  The following data was reviewed by: Parviz Mata MD on 12/09/2022:  Common labs    Common Labs 5/20/22 5/20/22 5/20/22 5/20/22 5/20/22 5/20/22    0937 0937 0937 0937 0940 0940   Glucose  106 (A)       BUN  12       Creatinine  1.05       Sodium  139       Potassium  4.3       Chloride  98       Calcium  9.6       Total Protein  7.1       Albumin  4.7       Total Bilirubin  1.1       Alkaline Phosphatase  114       AST (SGOT)  167 (A)       ALT (SGPT)  250 (A)       WBC CANCELED     10.9 (A)   Hemoglobin CANCELED     13.2   Hematocrit CANCELED     41.2   Platelets CANCELED     248   Total Cholesterol   313 (A)      Triglycerides   171 (A)      HDL Cholesterol   66      LDL Cholesterol    215 (A)      Hemoglobin A1C    CANCELED 5.6    (A) Abnormal value       Comments are available for some flowsheets but are not being displayed.                     Assessment and Plan   Diagnoses and all orders for this visit:    1. Primary hypertension (Primary)  Comments:  Losartan 25 mg daily  Orders:  -     Urinalysis With Microscopic If Indicated (No Culture) - Urine, Clean Catch; Future  -     TSH; Future  -     T4, Free; Future  -     T3, Free; Future  -     Lipid Panel With / Chol / HDL Ratio; Future  -     CBC & Differential; Future  -     Comprehensive Metabolic Panel; Future  -     Hemoglobin A1c; Future  -     Gamma GT; Future    2. Hypercholesterolemia  Comments:  Resume rosuvastatin 5 mg daily  Orders:  -     Urinalysis With Microscopic  If Indicated (No Culture) - Urine, Clean Catch; Future  -     TSH; Future  -     T4, Free; Future  -     T3, Free; Future  -     Lipid Panel With / Chol / HDL Ratio; Future  -     CBC & Differential; Future  -     Comprehensive Metabolic Panel; Future  -     Hemoglobin A1c; Future  -     Gamma GT; Future    3. Elevated liver enzymes  Comments:  Labs prior to physical in January  Orders:  -     Urinalysis With Microscopic If Indicated (No Culture) - Urine, Clean Catch; Future  -     TSH; Future  -     T4, Free; Future  -     T3, Free; Future  -     Lipid Panel With / Chol / HDL Ratio; Future  -     CBC & Differential; Future  -     Comprehensive Metabolic Panel; Future  -     Hemoglobin A1c; Future  -     Gamma GT; Future    4. Attention deficit hyperactivity disorder (ADHD), combined type  Comments:  Increase Vyvanse 40 mg daily  Orders:  -     lisdexamfetamine (Vyvanse) 40 MG capsule; Take 1 capsule by mouth Every Morning  Dispense: 30 capsule; Refill: 0    5. Anxiety  Comments:  Sertraline 100 mg daily             Follow Up   No follow-ups on file.  Patient was given instructions and counseling regarding his condition or for health maintenance advice. Please see specific information pulled into the AVS if appropriate.

## 2022-12-12 DIAGNOSIS — I10 PRIMARY HYPERTENSION: Primary | ICD-10-CM

## 2022-12-12 DIAGNOSIS — E78.00 HYPERCHOLESTEROLEMIA: ICD-10-CM

## 2022-12-12 DIAGNOSIS — R74.8 ELEVATED LIVER ENZYMES: ICD-10-CM

## 2022-12-12 DIAGNOSIS — R73.09 ELEVATED GLUCOSE: ICD-10-CM

## 2023-02-20 RX ORDER — ROSUVASTATIN CALCIUM 5 MG/1
TABLET, COATED ORAL
Qty: 90 TABLET | Refills: 0 | Status: SHIPPED | OUTPATIENT
Start: 2023-02-20

## 2023-03-06 ENCOUNTER — TELEPHONE (OUTPATIENT)
Dept: INTERNAL MEDICINE | Facility: CLINIC | Age: 48
End: 2023-03-06

## 2023-03-06 DIAGNOSIS — F90.2 ATTENTION DEFICIT HYPERACTIVITY DISORDER (ADHD), COMBINED TYPE: ICD-10-CM

## 2023-03-06 NOTE — TELEPHONE ENCOUNTER
Caller: Jefferson Chavez    Relationship: Self    Best call back number: [phone]    Requested Prescriptions:   Requested Prescriptions     Pending Prescriptions Disp Refills   • lisdexamfetamine (Vyvanse) 40 MG capsule 30 capsule 0     Sig: Take 1 capsule by mouth Every Morning        Pharmacy where request should be sent: [facility] [id number]  AT [facility] - [phone]  - [phone] FX     Additional details provided by patient: THE PATIENT IS CURRENTLY OUT OF THIS MEDICATION. THE PATIENT'S NEXT APPOINTMENT IS SCHEDULED FOR 05/02/2023. THE PATIENT RECENTLY HAD LABS DONE.     Does the patient have less than a 3 day supply:  [x] Yes  [] No    Would you like a call back once the refill request has been completed: [x] Yes [] No    If the office needs to give you a call back, can they leave a voicemail: [x] Yes [] No    Josué Leal Rep   03/06/23 14:03 EST

## 2023-03-06 NOTE — TELEPHONE ENCOUNTER
Rx Refill Note  Requested Prescriptions     Pending Prescriptions Disp Refills   • lisdexamfetamine (Vyvanse) 40 MG capsule 30 capsule 0     Sig: Take 1 capsule by mouth Every Morning      Last office visit with prescribing clinician: 12/9/2022   Last telemedicine visit with prescribing clinician: 5/2/2023   Next office visit with prescribing clinician: 5/2/2023                         Would you like a call back once the refill request has been completed: [] Yes [] No    If the office needs to give you a call back, can they leave a voicemail: [] Yes [] No    Zulma Ortega  03/06/23, 14:05 EST

## 2023-03-06 NOTE — TELEPHONE ENCOUNTER
Caller: Jefferson Chavez    Relationship: Self    Best call back number: [phone]    What is the medical concern/diagnosis: FREQUENT SNORING, WAKING UP OFTEN THROUGHOUT THE NIGHT    What specialty or service is being requested: SLEEP STUDY    What is the provider, practice or medical service name: PATIENT WOULD LIKE TO STICK WITH [facility]

## 2023-05-10 ENCOUNTER — OFFICE VISIT (OUTPATIENT)
Dept: SLEEP MEDICINE | Facility: HOSPITAL | Age: 48
End: 2023-05-10
Payer: COMMERCIAL

## 2023-05-10 VITALS — WEIGHT: 198 LBS | HEART RATE: 76 BPM | HEIGHT: 74 IN | BODY MASS INDEX: 25.41 KG/M2 | OXYGEN SATURATION: 95 %

## 2023-05-10 DIAGNOSIS — Z78.9 INTOLERANCE OF CONTINUOUS POSITIVE AIRWAY PRESSURE (CPAP) VENTILATION: ICD-10-CM

## 2023-05-10 DIAGNOSIS — G47.10 HYPERSOMNIA: ICD-10-CM

## 2023-05-10 DIAGNOSIS — G47.8 NON-RESTORATIVE SLEEP: ICD-10-CM

## 2023-05-10 DIAGNOSIS — G47.30 SLEEP APNEA, UNSPECIFIED TYPE: Primary | ICD-10-CM

## 2023-05-10 DIAGNOSIS — R06.83 SNORING: ICD-10-CM

## 2023-05-10 PROCEDURE — G0463 HOSPITAL OUTPT CLINIC VISIT: HCPCS

## 2023-05-10 NOTE — PROGRESS NOTES
"[facility] New Patient/Consultation       Reason for Consultation: SELENA      Patient Care Team:  Parviz Mata MD as PCP - General (Family Medicine)  Massiel Merlos APRN as Nurse Practitioner (Gastroenterology)  Vida Kovacs MD as Consulting Physician (Sleep Medicine)      History of present illness:  Thank you for asking me to see your patient.  The patient is a 48 y.o. male with hypertension hyperlipidemia anxiety ADHD presents today with concern for obstructive sleep apnea.  Patient reports snoring hypersomnia nonrestorative sleep witnessed apneas morning headaches waking with dry mouth sweating excessively during sleep nocturia up to 1 time a night restless sleep.  Patient reports history of sleep study in 2018 prescribed Pap breathing machine.  Intolerant to Pap machine.  Would like to get back into care however like to consider inspire therapy versus oral mandibular device.    Bedtime 10 PM to 11 PM sleep latency instant wake time 7 AM to 8 AM 0 naps no rotating shifts.      Social History: No tobacco alcohol caffeine use    Allergies:  Patient has no known allergies.    Family History: SELENA yes       Current Outpatient Medications:   •  lisdexamfetamine (Vyvanse) 40 MG capsule, Take 1 capsule by mouth Every Morning, Disp: 30 capsule, Rfl: 0  •  losartan (Cozaar) 25 MG tablet, Take 1 tablet by mouth Daily., Disp: 90 tablet, Rfl: 3  •  rosuvastatin (CRESTOR) 5 MG tablet, TAKE 1 TABLET BY MOUTH DAILY, Disp: 90 tablet, Rfl: 0  •  sertraline (ZOLOFT) 100 MG tablet, Take 1 tablet by mouth Daily., Disp: 90 tablet, Rfl: 3    Vital Signs:    Vitals:    05/10/23 1300   Pulse: 76   SpO2: 95%   Weight: 89.8 kg (198 lb)   Height: 186.7 cm (73.5\")      Body mass index is 25.77 kg/m².  Neck Circumference: 16 inches      REVIEW OF SYSTEMS.  Full review of systems available on the intake form which is scanned in the media tab.  The relevant positive are noted below  1. Daytime excessive sleepiness with Stafford " "Sleepiness Scale :Total score: 17   2. Snoring  3. Postnasal drip anxiety cough      Physical exam:  Vitals:    05/10/23 1300   Pulse: 76   SpO2: 95%   Weight: 89.8 kg (198 lb)   Height: 186.7 cm (73.5\")    Body mass index is 25.77 kg/m². Neck Circumference: 16 inches  HEENT: Head is atraumatic, normocephalic  Eyes: pupils are round equal and reacting to light and accommodation, conjunctiva normal  Throat: tongue normal, oral airway Mallampati class 2  NECK:Neck Circumference: 16 inches  RESPIRATORY SYSTEM: Regular respirations  CARDIOVASULAR SYSTEM: Regular rate  EXTREMITES: No cyanosis, clubbing  NEUROLOGICAL SYSTEM: Oriented x 3, no gross motor defects, gait normal      Impression:  1. Sleep apnea, unspecified type    2. Hypersomnia    3. Non-restorative sleep    4. Snoring    5. Intolerance of continuous positive airway pressure (CPAP) ventilation        Plan:    Good sleep hygiene measures should be maintained.  Weight loss would be beneficial in this patient who is overweight with BMI 25.8.    I discussed the pathophysiology of obstructive sleep apnea with the patient.  We discussed the adverse outcomes associated with untreated sleep-disordered breathing.  We discussed treatment modalities of obstructive sleep apnea including CPAP device as well as oral mandibular advancement device and inspire therapy. Sleep study will be scheduled to establish definitive diagnosis of sleep disorder breathing.  Weight loss will be strongly beneficial in order to reduce the severity of sleep-disordered breathing. Caution during activities that require prolonged concentration is strongly advised.  Patient will be notified of sleep study results after sleep study is completed.      If AHI 15 or greater up until 65 will refer to ENT to consider inspire therapy.  If less than 15 will refer to dental sleep specialist to consider oral mandibular device.    Thank you for allowing me to participate in your patient's care.    Vida CHILDERS" MD [place]  Sleep Medicine  05/10/23  14:16 EDT

## 2023-05-24 ENCOUNTER — HOSPITAL ENCOUNTER (OUTPATIENT)
Dept: SLEEP MEDICINE | Facility: HOSPITAL | Age: 48
End: 2023-05-24
Payer: COMMERCIAL

## 2023-05-24 DIAGNOSIS — R06.83 SNORING: ICD-10-CM

## 2023-05-24 DIAGNOSIS — G47.8 NON-RESTORATIVE SLEEP: ICD-10-CM

## 2023-05-24 DIAGNOSIS — G47.10 HYPERSOMNIA: ICD-10-CM

## 2023-05-24 DIAGNOSIS — G47.30 SLEEP APNEA, UNSPECIFIED TYPE: ICD-10-CM

## 2023-05-24 DIAGNOSIS — Z78.9 INTOLERANCE OF CONTINUOUS POSITIVE AIRWAY PRESSURE (CPAP) VENTILATION: ICD-10-CM

## 2023-05-24 PROCEDURE — 95806 SLEEP STUDY UNATT&RESP EFFT: CPT

## 2023-05-31 ENCOUNTER — TELEPHONE (OUTPATIENT)
Dept: SLEEP MEDICINE | Facility: HOSPITAL | Age: 48
End: 2023-05-31

## 2023-06-19 RX ORDER — LOSARTAN POTASSIUM 25 MG/1
25 TABLET ORAL DAILY
Qty: 90 TABLET | Refills: 3 | Status: SHIPPED | OUTPATIENT
Start: 2023-06-19

## 2023-06-19 RX ORDER — SERTRALINE HYDROCHLORIDE 100 MG/1
100 TABLET, FILM COATED ORAL DAILY
Qty: 90 TABLET | Refills: 3 | Status: SHIPPED | OUTPATIENT
Start: 2023-06-19

## 2023-06-19 NOTE — TELEPHONE ENCOUNTER
Rx Refill Note  Requested Prescriptions     Pending Prescriptions Disp Refills    losartan (COZAAR) 25 MG tablet [Pharmacy Med Name: LOSARTAN 25MG TABLETS] 90 tablet 3     Sig: TAKE 1 TABLET BY MOUTH DAILY    sertraline (ZOLOFT) 100 MG tablet [Pharmacy Med Name: SERTRALINE 100MG TABLETS] 90 tablet 3     Sig: TAKE 1 TABLET BY MOUTH DAILY      Last office visit with prescribing clinician: 12/9/2022   Last telemedicine visit with prescribing clinician: Visit date not found   Next office visit with prescribing clinician: Visit date not found                         Would you like a call back once the refill request has been completed: [] Yes [] No    If the office needs to give you a call back, can they leave a voicemail: [] Yes [] No    Zulma Ortega  06/19/23, 08:49 EDT

## 2023-08-17 ENCOUNTER — TELEPHONE (OUTPATIENT)
Dept: INTERNAL MEDICINE | Facility: CLINIC | Age: 48
End: 2023-08-17

## 2023-08-17 NOTE — TELEPHONE ENCOUNTER
Caller: HAVEN    Relationship: INSURANCE    Best call back number: [phone]        Who are you requesting to speak with (clinical staff, provider,  specific staff member): CLINICAL        What was the call regarding: VYANSE NOT COVERED BY DRUG PLAN. GENERICS FOR ADDERALL ARE COVERED. INSURANCE REQUESTING POSSIBLE CHANGE OF MEDICATION

## 2023-08-17 NOTE — TELEPHONE ENCOUNTER
Pt has not been seen since 12/9/22-it is [place] state law that a patient on a controlled substance is seen every 6 months and he cancelled his last appt in May    Looks like he's scheduled to start with a new PCP on 9/21/23 he needs to discuss with them then or make an appt to come in and discuss with Dr Adolfo MEDINA FOR HUB TO READ

## 2023-09-02 NOTE — PATIENT INSTRUCTIONS
"Hypertension, Adult  High blood pressure (hypertension) is when the force of blood pumping through the arteries is too strong. The arteries are the blood vessels that carry blood from the heart throughout the body. Hypertension forces the heart to work harder to pump blood and may cause arteries to become narrow or stiff. Untreated or uncontrolled hypertension can cause a heart attack, heart failure, a stroke, kidney disease, and other problems.  A blood pressure reading consists of a higher number over a lower number. Ideally, your blood pressure should be below 120/80. The first (\"top\") number is called the systolic pressure. It is a measure of the pressure in your arteries as your heart beats. The second (\"bottom\") number is called the diastolic pressure. It is a measure of the pressure in your arteries as the heart relaxes.  What are the causes?  The exact cause of this condition is not known. There are some conditions that result in or are related to high blood pressure.  What increases the risk?  Some risk factors for high blood pressure are under your control. The following factors may make you more likely to develop this condition:  · Smoking.  · Having type 2 diabetes mellitus, high cholesterol, or both.  · Not getting enough exercise or physical activity.  · Being overweight.  · Having too much fat, sugar, calories, or salt (sodium) in your diet.  · Drinking too much alcohol.  Some risk factors for high blood pressure may be difficult or impossible to change. Some of these factors include:  · Having chronic kidney disease.  · Having a family history of high blood pressure.  · Age. Risk increases with age.  · Race. You may be at higher risk if you are .  · Gender. Men are at higher risk than women before age 45. After age 65, women are at higher risk than men.  · Having obstructive sleep apnea.  · Stress.  What are the signs or symptoms?  High blood pressure may not cause symptoms. Very high " blood pressure (hypertensive crisis) may cause:  · Headache.  · Anxiety.  · Shortness of breath.  · Nosebleed.  · Nausea and vomiting.  · Vision changes.  · Severe chest pain.  · Seizures.  How is this diagnosed?  This condition is diagnosed by measuring your blood pressure while you are seated, with your arm resting on a flat surface, your legs uncrossed, and your feet flat on the floor. The cuff of the blood pressure monitor will be placed directly against the skin of your upper arm at the level of your heart. It should be measured at least twice using the same arm. Certain conditions can cause a difference in blood pressure between your right and left arms.  Certain factors can cause blood pressure readings to be lower or higher than normal for a short period of time:  · When your blood pressure is higher when you are in a health care provider's office than when you are at home, this is called white coat hypertension. Most people with this condition do not need medicines.  · When your blood pressure is higher at home than when you are in a health care provider's office, this is called masked hypertension. Most people with this condition may need medicines to control blood pressure.  If you have a high blood pressure reading during one visit or you have normal blood pressure with other risk factors, you may be asked to:  · Return on a different day to have your blood pressure checked again.  · Monitor your blood pressure at home for 1 week or longer.  If you are diagnosed with hypertension, you may have other blood or imaging tests to help your health care provider understand your overall risk for other conditions.  How is this treated?  This condition is treated by making healthy lifestyle changes, such as eating healthy foods, exercising more, and reducing your alcohol intake. Your health care provider may prescribe medicine if lifestyle changes are not enough to get your blood pressure under control, and  if:  · Your systolic blood pressure is above 130.  · Your diastolic blood pressure is above 80.  Your personal target blood pressure may vary depending on your medical conditions, your age, and other factors.  Follow these instructions at home:  Eating and drinking    · Eat a diet that is high in fiber and potassium, and low in sodium, added sugar, and fat. An example eating plan is called the DASH (Dietary Approaches to Stop Hypertension) diet. To eat this way:  ? Eat plenty of fresh fruits and vegetables. Try to fill one half of your plate at each meal with fruits and vegetables.  ? Eat whole grains, such as whole-wheat pasta, brown rice, or whole-grain bread. Fill about one fourth of your plate with whole grains.  ? Eat or drink low-fat dairy products, such as skim milk or low-fat yogurt.  ? Avoid fatty cuts of meat, processed or cured meats, and poultry with skin. Fill about one fourth of your plate with lean proteins, such as fish, chicken without skin, beans, eggs, or tofu.  ? Avoid pre-made and processed foods. These tend to be higher in sodium, added sugar, and fat.  · Reduce your daily sodium intake. Most people with hypertension should eat less than 1,500 mg of sodium a day.  · Do not drink alcohol if:  ? Your health care provider tells you not to drink.  ? You are pregnant, may be pregnant, or are planning to become pregnant.  · If you drink alcohol:  ? Limit how much you use to:  § 0-1 drink a day for women.  § 0-2 drinks a day for men.  ? Be aware of how much alcohol is in your drink. In the [place] one drink equals one 12 oz bottle of beer (355 mL), one 5 oz glass of wine (148 mL), or one 1½ oz glass of hard liquor (44 mL).  Lifestyle    · Work with your health care provider to maintain a healthy body weight or to lose weight. Ask what an ideal weight is for you.  · Get at least 30 minutes of exercise most days of the week. Activities may include walking, swimming, or biking.  · Include exercise to  strengthen your muscles (resistance exercise), such as Pilates or lifting weights, as part of your weekly exercise routine. Try to do these types of exercises for 30 minutes at least 3 days a week.  · Do not use any products that contain nicotine or tobacco, such as cigarettes, e-cigarettes, and chewing tobacco. If you need help quitting, ask your health care provider.  · Monitor your blood pressure at home as told by your health care provider.  · Keep all follow-up visits as told by your health care provider. This is important.  Medicines  · Take over-the-counter and prescription medicines only as told by your health care provider. Follow directions carefully. Blood pressure medicines must be taken as prescribed.  · Do not skip doses of blood pressure medicine. Doing this puts you at risk for problems and can make the medicine less effective.  · Ask your health care provider about side effects or reactions to medicines that you should watch for.  Contact a health care provider if you:  · Think you are having a reaction to a medicine you are taking.  · Have headaches that keep coming back (recurring).  · Feel dizzy.  · Have swelling in your ankles.  · Have trouble with your vision.  Get help right away if you:  · Develop a severe headache or confusion.  · Have unusual weakness or numbness.  · Feel faint.  · Have severe pain in your chest or abdomen.  · Vomit repeatedly.  · Have trouble breathing.  Summary  · Hypertension is when the force of blood pumping through your arteries is too strong. If this condition is not controlled, it may put you at risk for serious complications.  · Your personal target blood pressure may vary depending on your medical conditions, your age, and other factors. For most people, a normal blood pressure is less than 120/80.  · Hypertension is treated with lifestyle changes, medicines, or a combination of both. Lifestyle changes include losing weight, eating a healthy, low-sodium diet,  exercising more, and limiting alcohol.  This information is not intended to replace advice given to you by your health care provider. Make sure you discuss any questions you have with your health care provider.  Document Revised: 08/28/2019 Document Reviewed: 08/28/2019  [employer] Patient Education © 2021 [employer]    Fatty Liver Disease    Fatty liver disease occurs when too much fat has built up in your liver cells. Fatty liver disease is also called hepatic steatosis or steatohepatitis. The liver removes harmful substances from your bloodstream and produces fluids that your body needs. It also helps your body use and store energy from the food you eat.  In many cases, fatty liver disease does not cause symptoms or problems. It is often diagnosed when tests are being done for other reasons. However, over time, fatty liver can cause inflammation that may lead to more serious liver problems, such as scarring of the liver (cirrhosis) and liver failure.  Fatty liver is associated with insulin resistance, increased body fat, high blood pressure (hypertension), and high cholesterol. These are features of metabolic syndrome and increase your risk for stroke, diabetes, and heart disease.  What are the causes?  This condition may be caused by:  · Drinking too much alcohol.  · Poor nutrition.  · Obesity.  · Cushing's syndrome.  · Diabetes.  · High cholesterol.  · Certain drugs.  · Poisons.  · Some viral infections.  · Pregnancy.  What increases the risk?  You are more likely to develop this condition if you:  · Abuse alcohol.  · Are overweight.  · Have diabetes.  · Have hepatitis.  · Have a high triglyceride level.  · Are pregnant.  What are the signs or symptoms?  Fatty liver disease often does not cause symptoms. If symptoms do develop, they can include:  · Fatigue.  · Weakness.  · Weight loss.  · Confusion.  · Abdominal pain.  · Nausea and vomiting.  · Yellowing of your skin and the white parts of your eyes  (jaundice).  · Itchy skin.  How is this diagnosed?  This condition may be diagnosed by:  · A physical exam and medical history.  · Blood tests.  · Imaging tests, such as an ultrasound, CT scan, or MRI.  · A liver biopsy. A small sample of liver tissue is removed using a needle. The sample is then looked at under a microscope.  How is this treated?  Fatty liver disease is often caused by other health conditions. Treatment for fatty liver may involve medicines and lifestyle changes to manage conditions such as:  · Alcoholism.  · High cholesterol.  · Diabetes.  · Being overweight or obese.  Follow these instructions at home:    · Do not drink alcohol. If you have trouble quitting, ask your health care provider how to safely quit with the help of medicine or a supervised program. This is important to keep your condition from getting worse.  · Eat a healthy diet as told by your health care provider. Ask your health care provider about working with a diet and nutrition specialist (dietitian) to develop an eating plan.  · Exercise regularly. This can help you lose weight and control your cholesterol and diabetes. Talk to your health care provider about an exercise plan and which activities are best for you.  · Take over-the-counter and prescription medicines only as told by your health care provider.  · Keep all follow-up visits as told by your health care provider. This is important.  Contact a health care provider if:  You have trouble controlling your:  · Blood sugar. This is especially important if you have diabetes.  · Cholesterol.  · Drinking of alcohol.  Get help right away if:  · You have abdominal pain.  · You have jaundice.  · You have nausea and vomiting.  · You vomit blood or material that looks like coffee grounds.  · You have stools that are black, tar-like, or bloody.  Summary  · Fatty liver disease develops when too much fat builds up in the cells of your liver.  · Fatty liver disease often causes no  symptoms or problems. However, over time, fatty liver can cause inflammation that may lead to more serious liver problems, such as scarring of the liver (cirrhosis).  · You are more likely to develop this condition if you abuse alcohol, are pregnant, are overweight, have diabetes, have hepatitis, or have high triglyceride levels.  · Contact your health care provider if you have trouble controlling your weight, blood sugar, cholesterol, or drinking of alcohol.  This information is not intended to replace advice given to you by your health care provider. Make sure you discuss any questions you have with your health care provider.  Document Revised: 11/30/2018 Document Reviewed: 09/26/2018  [employer] Patient Education © 2021 [employer]     None

## 2023-09-21 ENCOUNTER — OFFICE VISIT (OUTPATIENT)
Dept: INTERNAL MEDICINE | Facility: CLINIC | Age: 48
End: 2023-09-21
Payer: COMMERCIAL

## 2023-09-21 VITALS
DIASTOLIC BLOOD PRESSURE: 100 MMHG | HEIGHT: 74 IN | OXYGEN SATURATION: 97 % | BODY MASS INDEX: 22.2 KG/M2 | HEART RATE: 90 BPM | SYSTOLIC BLOOD PRESSURE: 140 MMHG | WEIGHT: 173 LBS

## 2023-09-21 DIAGNOSIS — F90.2 ATTENTION DEFICIT HYPERACTIVITY DISORDER (ADHD), COMBINED TYPE: ICD-10-CM

## 2023-09-21 DIAGNOSIS — Z79.899 HIGH RISK MEDICATION USE: ICD-10-CM

## 2023-09-21 DIAGNOSIS — Z23 NEED FOR IMMUNIZATION AGAINST INFLUENZA: ICD-10-CM

## 2023-09-21 DIAGNOSIS — E03.8 SUBCLINICAL HYPOTHYROIDISM: ICD-10-CM

## 2023-09-21 DIAGNOSIS — I10 PRIMARY HYPERTENSION: ICD-10-CM

## 2023-09-21 DIAGNOSIS — Z23 NEED FOR PNEUMOCOCCAL VACCINE: ICD-10-CM

## 2023-09-21 DIAGNOSIS — E78.2 MIXED HYPERLIPIDEMIA: Primary | ICD-10-CM

## 2023-09-21 DIAGNOSIS — K70.10 ALCOHOLIC STEATOHEPATITIS: ICD-10-CM

## 2023-09-21 RX ORDER — LOSARTAN POTASSIUM 50 MG/1
50 TABLET ORAL DAILY
Qty: 90 TABLET | Refills: 0 | Status: SHIPPED | OUTPATIENT
Start: 2023-09-21

## 2023-09-21 RX ORDER — ROSUVASTATIN CALCIUM 20 MG/1
20 TABLET, COATED ORAL DAILY
Qty: 90 TABLET | Refills: 0 | Status: SHIPPED | OUTPATIENT
Start: 2023-09-21

## 2023-09-21 NOTE — PROGRESS NOTES
"  Newton Sibley D.O.  Internal Medicine  [facility]  [address], [address]  [place] [address]  [phone]      Chief Complaint  Establish Care    SUBJECTIVE    History of Present Illness    Jefferson Chavez is a 48 y.o. male who presents to the office today as a new patient to establish care.   Patient is transferring care from Dr Hay [facility].    HLD: dramatically elevated LDL over 190 at initiation of treatment; currently prescribed rosuvastatin 5 mg daily but has been off that in 3-4 months due to fear of his liver.     Alcoholic steatohepatitis : Has followed once with [facility] in 2021.  Last fibrosure 7/2021 with F1-F2 fibrosis score. He has had a ultrasound elastography 9/2021 which \"rules in compensated advanced chronic liver disease and is suggestive of clinicallysignificant portal hypertension\". States he is in an outpatient alcohol program , no alcohol use in the last few weeks. He has around a 25 year alcohol history around a few pints of vodka per day. States he sleeps so much better and feels better.     Heartburn: not currently an active issue     Anxiety: takes sertraline 100 mg daily , states he only occasionally gets anxiety and this has been great.     ADHD: previously prescribed Vyvanse 40 mg daily , has not had a prescription for around 5 months. States that dosage was good for him. He is having issues getting sidetracked. Overall feels more focused and productive taking the medication. States he has attempted to come off the medication several times over the last few years without success.     HTN: prescribed losartan 25 mg daily which he is taking. Doesn't check blood pressure at home. Not exercising regularly.     Subclinical hypothyroidism: denies new or worsening fatigue, constipation, or cold intolerance     SELENA: follows with [facility] sleep medicine, has been back on CPAP therapy per his report.      No Known Allergies     Outpatient Medications " "Marked as Taking for the 9/21/23 encounter (Office Visit) with Newton Sibley DO   Medication Sig Dispense Refill    sertraline (ZOLOFT) 100 MG tablet TAKE 1 TABLET BY MOUTH DAILY 90 tablet 3    [DISCONTINUED] losartan (COZAAR) 25 MG tablet TAKE 1 TABLET BY MOUTH DAILY 90 tablet 3        Past Medical History:   Diagnosis Date    ADHD     Alcohol use disorder     Alcoholic steatohepatitis     Anxiety     Heartburn     Hyperlipidemia     Hypertension     SELENA (obstructive sleep apnea)     Subclinical hypothyroidism      Past Surgical History:   Procedure Laterality Date    VASECTOMY      WISDOM TOOTH EXTRACTION       Family History   Problem Relation Age of Onset    Heart disease Mother     Heart attack Mother     Hypertension Mother     Skin cancer Mother     No Known Problems Father     Ulcerative colitis Sister     Anxiety disorder Brother     Cancer Maternal Grandfather     Cancer Maternal Uncle     Anxiety disorder Other     Colon cancer Neg Hx     Colon polyps Neg Hx     Crohn's disease Neg Hx     Irritable bowel syndrome Neg Hx     reports that he quit smoking about 23 years ago. His smoking use included cigarettes. He has a 1.00 pack-year smoking history. He has never used smokeless tobacco. He reports that he does not currently use alcohol. He reports that he does not currently use drugs after having used the following drugs: Marijuana.    OBJECTIVE    Vital Signs:   /100   Pulse 90   Ht 186.7 cm (73.5\")   Wt 78.5 kg (173 lb)   SpO2 97%   BMI 22.52 kg/m²     Physical Exam  Vitals reviewed.   Constitutional:       General: He is not in acute distress.     Appearance: Normal appearance. He is normal weight. He is not ill-appearing.   HENT:      Head: Normocephalic and atraumatic.   Eyes:      General: No scleral icterus.  Cardiovascular:      Rate and Rhythm: Normal rate and regular rhythm.      Heart sounds: Normal heart sounds. No murmur heard.  Pulmonary:      Effort: Pulmonary effort is normal. No " respiratory distress.      Breath sounds: Normal breath sounds. No wheezing or rhonchi.   Abdominal:      General: Bowel sounds are normal. There is no distension.      Palpations: Abdomen is soft.      Tenderness: There is no abdominal tenderness. There is no guarding.      Comments: Liver edge is palpated 2.5 cm below the right rib border    Musculoskeletal:      Right lower leg: No edema.      Left lower leg: No edema.   Skin:     Coloration: Skin is not jaundiced.   Neurological:      Mental Status: He is alert.   Psychiatric:         Mood and Affect: Mood normal.         Behavior: Behavior normal.         Thought Content: Thought content normal.          The following data was reviewed by: Newton Sibley DO on 09/21/2023:  Common labs          2/28/2023    09:01   Common Labs   Glucose 105    BUN 13    Creatinine 1.08    Sodium 138    Potassium 4.6    Chloride 98    Calcium 9.5    Total Protein 7.6    Albumin 4.9    Total Bilirubin 1.6    Alkaline Phosphatase 131    AST (SGOT) 150    ALT (SGPT) 158    WBC 5.10    Hemoglobin 16.6    Hematocrit 49.6    Platelets 208    Total Cholesterol 297    Triglycerides 127    HDL Cholesterol 79    LDL Cholesterol  196    Hemoglobin A1C 5.30                   ASSESSMENT & PLAN     Diagnoses and all orders for this visit:    1. Mixed hyperlipidemia (Primary)  Lab Results   Component Value Date    CHLPL 297 (H) 02/28/2023    TRIG 127 02/28/2023    HDL 79 (H) 02/28/2023     (H) 02/28/2023     -dramatically elevated LDL over 190 at initiation of treatment; currently prescribed rosuvastatin 5 mg daily but has been off that in 3-4 months due to fear of his liver.  -advised pt that having high liver enzymes is not a contraindication to statin therapy; in fact, having liver disease such as his does put him at increased risk of CV events. Statin therapy reduces these events. At this point , I will increase rosuvastatin to 20 mg daily as he has never had control in the past on 5 mg  "daily.     2. Alcoholic steatohepatitis  -likely related to alcohol use given significant elevations in GGT in the past and alcohol use  -advised pt that hepatic steatosis refers to liver fat accumulation; advised pt that early-stage fatty liver disease doesn't usually cause any harm, but it can lead to serious liver damage, including cirrhosis and liver cancer, if it gets worse. Having high levels of fat in your liver is also associated with an increased risk of problems such as diabetes, heart attacks and strokes.  -Has followed once with [facility] in 2021.  Last fibrosure 7/2021 with F1-F2 fibrosis score. He has had a ultrasound elastography 9/2021 which \"rules in compensated advanced chronic liver disease and is suggestive of clinicallysignificant portal hypertension\".   -advised pt that his last results indicate he has liver scarring and damage that places him at increased risk of irreversible damage that could lead to cirrhosis   -advised pt of general lifestyle modifications to treat fatty liver disease   *lose weight and maintain a normal BMI   *dietary changes such as low fat, low sugar, and low salt and high in vegetables    *exercise 150 minutes weekly at a moderate pace or more    *stop smoking    *cut down or stop drinking   -patient's alcohol use history: heavy several pints per day for around 25 years. Is now in rehab outpatient and sober for 2 weeks.   -screening for hepatitis A/B/C: hepatitis C screening negative. Will check immunity to hepatitis A & B  -screening for hemochromatosis: he has had high ferritin and normal Tsat in the past. I will obtain hemochromatosis mutation testing to clear the picture.   -screening for autoimmune hepatitis screening: partially completed in the past. Will obtain autoimmune liver disease profile today.  -FibroSure ever 2-3 years: will update today   -Recommended pneumococcal vaccination which he accepted     3. Attention deficit hyperactivity disorder (ADHD), " combined type  4. High risk medication use  -previously prescribed Vyvanse 40 mg daily , has not had a prescription for around 5 months. States that dosage was good for him. He is having issues getting sidetracked. Overall feels more focused and productive taking the medication. States he has attempted to come off the medication several times over the last few years without success.   -ROXANNA reviewed which is appropriate  -will have pt obtain UDS today and sign controlled substance agreement  -pending negative UDS will restart his Vyvanse     5. Primary hypertension   -prescribed losartan 25 mg daily which he is taking. Doesn't check blood pressure at home. Not exercising regularly.   -recommended increased exercise  -his alcohol cessation should also help with this  -BP uncontrolled 140/100 today  -increase losartan to 50 mg daily  -Asked patient to check his BP 3-5 times weekly at random times after he has been seated for 5 minutes or longer. Goal blood pressure is less than 130 systolic and less than 80 diastolic. Bring log to next visit.     6. Subclinical hypothyroidism  Lab Results   Component Value Date    TSH 4.330 (H) 02/28/2023     -normal Free T4  -continue to monitor, treatment not indicated at this time           I spent 45  minutes caring for Jefferson on this date of service. This time includes time spent by me in the following activities:preparing for the visit, reviewing tests, performing a medically appropriate examination and/or evaluation , counseling and educating the patient/family/caregiver, ordering medications, tests, or procedures, and documenting information in the medical record    The following social determinates of health impact the patient's medical decision making: No social determinates of health were factored in to today's visit.     Follow Up  Return in about 4 weeks (around 10/19/2023) for Annual physical.    Patient/family had no further questions at this time and verbalized  understanding of the plan discussed today.

## 2023-10-06 LAB
A2 MACROGLOB SERPL-MCNC: 142 MG/DL (ref 110–276)
ALBUMIN SERPL-MCNC: 4.8 G/DL (ref 4.1–5.1)
ALBUMIN/GLOB SERPL: 2.1 {RATIO} (ref 1.2–2.2)
ALP SERPL-CCNC: 112 IU/L (ref 44–121)
ALT SERPL W P-5'-P-CCNC: 137 IU/L (ref 0–55)
ALT SERPL-CCNC: 129 IU/L (ref 0–44)
ANA SER QL IF: POSITIVE
ANA SPECKLED TITR SER: ABNORMAL {TITER}
APO A-I SERPL-MCNC: 137 MG/DL (ref 101–178)
APTT PPP: 28 SEC (ref 24–33)
ASH SCORING: ABNORMAL
AST SERPL W P-5'-P-CCNC: 94 IU/L (ref 0–40)
AST SERPL-CCNC: 88 IU/L (ref 0–40)
ATYP ANCA SER QL IF: NEGATIVE
BILIRUB SERPL-MCNC: 1 MG/DL (ref 0–1.2)
BILIRUB SERPL-MCNC: 1.1 MG/DL (ref 0–1.2)
BUN SERPL-MCNC: 17 MG/DL (ref 6–24)
BUN/CREAT SERPL: 15 (ref 9–20)
CALCIUM SERPL-MCNC: 9.8 MG/DL (ref 8.7–10.2)
CHLORIDE SERPL-SCNC: 99 MMOL/L (ref 96–106)
CHOLEST SERPL-MCNC: 218 MG/DL (ref 100–199)
CHROMATIN IGG SERPL-ACNC: <20 UNITS
CO2 SERPL-SCNC: 24 MMOL/L (ref 20–29)
CREAT SERPL-MCNC: 1.16 MG/DL (ref 0.76–1.27)
EGFRCR SERPLBLD CKD-EPI 2021: 79 ML/MIN/1.73
FERRITIN SERPL-MCNC: 354 NG/ML (ref 30–400)
FIBROSIS SCORING:: ABNORMAL
FIBROSIS STAGE SERPL QL: ABNORMAL
FOLATE SERPL-MCNC: 7.1 NG/ML
GGT SERPL-CCNC: 384 IU/L (ref 0–65)
GLOBULIN SER CALC-MCNC: 2.3 G/DL (ref 1.5–4.5)
GLUCOSE SERPL-MCNC: 94 MG/DL (ref 70–99)
GLUCOSE SERPL-MCNC: 97 MG/DL (ref 70–99)
HAPTOGLOB SERPL-MCNC: 67 MG/DL (ref 23–355)
HAV AB SER QL IA: NEGATIVE
HBV CORE AB SERPL QL IA: NEGATIVE
HBV SURFACE AB SER QL: NON REACTIVE
HBV SURFACE AG SERPL QL IA: NEGATIVE
HFE GENE MUT ANL BLD/T: NORMAL
IMP & REVIEW OF LAB RESULTS: NORMAL
IMP & REVIEW OF LAB RESULTS: NORMAL
INR PPP: 1 (ref 0.9–1.2)
INTERPRETATION: ABNORMAL
IRON SATN MFR SERPL: 26 % (ref 15–55)
IRON SERPL-MCNC: 97 UG/DL (ref 38–169)
LABORATORY COMMENT REPORT: ABNORMAL
LABORATORY COMMENT REPORT: ABNORMAL
LABORATORY COMMENT REPORT: NORMAL
LIVER FIBR SCORE SERPL CALC.FIBROSURE: 0.53 (ref 0–0.21)
LKM-1 AB SER-ACNC: <20 UNITS
MITOCHONDRIA AB SER-ACNC: <20 UNITS
NECROINFLAMMATORY ACT GRADE SERPL QL: ABNORMAL
NECROINFLAMMATORY ACT SCORE SERPL: 0.01 (ref 0–17)
POTASSIUM SERPL-SCNC: 4.2 MMOL/L (ref 3.5–5.2)
PROT SERPL-MCNC: 7.1 G/DL (ref 6–8.5)
PROTHROMBIN TIME: 10.2 SEC (ref 9.1–12)
SERVICE CMNT-IMP: ABNORMAL
SODIUM SERPL-SCNC: 138 MMOL/L (ref 134–144)
SOLUBLE LIVER AB SER-ACNC: <20 UNITS
STEATOSIS GRADE (REFERENCE): ABNORMAL
STEATOSIS GRADING (REFERENCE): ABNORMAL
STEATOSIS SCORE (REFERENCE): 0.74 (ref 0–0.3)
TEST PERFORMANCE INFO SPEC: ABNORMAL
TIBC SERPL-MCNC: 372 UG/DL (ref 250–450)
TRIGL SERPL-MCNC: 107 MG/DL (ref 0–149)
UIBC SERPL-MCNC: 275 UG/DL (ref 111–343)
UNABLE TO VOID: NORMAL
VIT B12 SERPL-MCNC: 799 PG/ML (ref 232–1245)

## 2023-12-07 ENCOUNTER — OFFICE VISIT (OUTPATIENT)
Dept: INTERNAL MEDICINE | Facility: CLINIC | Age: 48
End: 2023-12-07
Payer: COMMERCIAL

## 2023-12-07 VITALS
DIASTOLIC BLOOD PRESSURE: 80 MMHG | WEIGHT: 194 LBS | SYSTOLIC BLOOD PRESSURE: 132 MMHG | BODY MASS INDEX: 24.9 KG/M2 | OXYGEN SATURATION: 97 % | HEART RATE: 68 BPM | HEIGHT: 74 IN

## 2023-12-07 DIAGNOSIS — E78.2 MIXED HYPERLIPIDEMIA: ICD-10-CM

## 2023-12-07 DIAGNOSIS — S69.91XA JAMMED INTERPHALANGEAL JOINT OF FINGER OF RIGHT HAND, INITIAL ENCOUNTER: ICD-10-CM

## 2023-12-07 DIAGNOSIS — I10 PRIMARY HYPERTENSION: ICD-10-CM

## 2023-12-07 DIAGNOSIS — F90.2 ATTENTION DEFICIT HYPERACTIVITY DISORDER (ADHD), COMBINED TYPE: ICD-10-CM

## 2023-12-07 DIAGNOSIS — Z79.899 HIGH RISK MEDICATION USE: ICD-10-CM

## 2023-12-07 DIAGNOSIS — K70.10 ALCOHOLIC STEATOHEPATITIS: Primary | ICD-10-CM

## 2023-12-07 NOTE — PROGRESS NOTES
"  Newton Sibley D.O.  Internal Medicine  [facility]  [address], [address]  [place] [address]  [phone]      Chief Complaint  Hyperlipidemia    SUBJECTIVE    History of Present Illness    Jefferson Chavez is a 48 y.o. male who presents to the office today as an established patient that last saw me on 9/21/2023.     Hyperlipidemia: at last visit restarted rosuvastatin at 20 mg daily . Pt reports mother had 2 heart attacks, first at age 60. His father has not had heart attack or strokes.     Alcoholic fatty liver disease: Has followed once with [facility] in 2021. Fibrosure 7/2021 with F1-F2 fibrosis score. He has had a ultrasound elastography 9/2021 which \"rules in compensated advanced chronic liver disease and is suggestive of clinicallysignificant portal hypertension\". Patient's alcohol use history- heavy several pints per day for around 25 years. Recently went inpatient for 37 days at [place]. Has been alcohol free for 51 days approximately. Is currently in outpatient program as well.  Hemochromatosis mutation testing negative. 2023 Fibrosure significant for no MAXWELL, S3 marked or severe steatosis, F2 bridging fibrosis with few septa.  Autoimmune liver disease profile positive with TERI very weak at 1:40.    Attention deficit hyperactivity disorder : previously prescribed Vyvanse 40 mg daily , has not had a prescription since 3/2023.     Primary hypertension : at last visit increased losartan from 25 mg daily to 50 mg daily . Is checking BP at home and gets 120s-130/80s.     No Known Allergies     Outpatient Medications Marked as Taking for the 12/7/23 encounter (Office Visit) with Newton Sibley, DO   Medication Sig Dispense Refill    losartan (Cozaar) 50 MG tablet Take 1 tablet by mouth Daily. 90 tablet 0    rosuvastatin (CRESTOR) 20 MG tablet Take 1 tablet by mouth Daily. 90 tablet 0    sertraline (ZOLOFT) 100 MG tablet TAKE 1 TABLET BY MOUTH DAILY 90 tablet 3        Past Medical " "History:   Diagnosis Date    ADHD     Alcohol use disorder     Alcoholic steatohepatitis     Anxiety     Heartburn     Hyperlipidemia     Hypertension     SELENA (obstructive sleep apnea)     Subclinical hypothyroidism        OBJECTIVE    Vital Signs:   /80   Pulse 68   Ht 186.7 cm (73.5\")   Wt 88 kg (194 lb)   SpO2 97%   BMI 25.25 kg/m²     Physical Exam  Vitals reviewed.   Constitutional:       General: He is not in acute distress.     Appearance: Normal appearance. He is not ill-appearing.   HENT:      Head: Atraumatic.   Eyes:      General: No scleral icterus.  Cardiovascular:      Rate and Rhythm: Normal rate and regular rhythm.      Heart sounds: Normal heart sounds. No murmur heard.  Pulmonary:      Effort: Pulmonary effort is normal. No respiratory distress.      Breath sounds: Normal breath sounds. No stridor. No wheezing or rhonchi.   Musculoskeletal:      Right lower leg: No edema.      Left lower leg: No edema.      Comments: Right 4th finger: tenderness to palpation with slight joint swelling at the PIP joint. No erythema.    Skin:     Coloration: Skin is not jaundiced.   Neurological:      Mental Status: He is alert.   Psychiatric:         Mood and Affect: Mood normal.         Behavior: Behavior normal.         Thought Content: Thought content normal.                             ASSESSMENT & PLAN     Diagnoses and all orders for this visit:    1. Alcoholic steatohepatitis (Primary)  -Has followed once with [facility] in 2021. Fibrosure 7/2021 with F1-F2 fibrosis score. He has had a ultrasound elastography 9/2021 which \"rules in compensated advanced chronic liver disease and is suggestive of clinicallysignificant portal hypertension\". Patient's alcohol use history- heavy several pints per day for around 25 years. Recently went inpatient for 37 days at [place]. Has been alcohol free for 51 days approximately. Is currently in outpatient program as well.  Hemochromatosis mutation testing negative. " 2023 Fibrosure significant for no MAXWELL, S3 marked or severe steatosis, F2 bridging fibrosis with few septa.  Autoimmune liver disease profile positive with TERI very weak at 1:40.  -he is remaining sober which is great and we are getting his BP and HLD treated . This is all great news. Weight is near normal. Overall continue current regimen.  -recheck CMP to assess for continuing improvement in LFTs  -     Comprehensive Metabolic Panel    2. Mixed hyperlipidemia  -at last visit restarted rosuvastatin at 20 mg daily . Pt reports mother had 2 heart attacks, first at age 60. His father has not had heart attack or strokes.   -pt states he was even told in childhood that he had very high cholesterol  Lab Results   Component Value Date    CHLPL 297 (H) 02/28/2023    TRIG 107 09/26/2023    HDL 79 (H) 02/28/2023     (H) 02/28/2023     -last lipid above... Will recheck [facility] lipid to see if he has had any improvement with rosuvastatin 20 mg daily. We did discuss the possibility of familial hypercholesterolemia given the degree of elevation of his LDL. Discussed the potential implication for himself and his kids including early heart attack and stroke risk. He was accepting of genetic testing so we will obtain that as well.  -     Lipid Panel  -     GeneSeq: Coronary Artery Disease - Blood,    3. Attention deficit hyperactivity disorder (ADHD), combined type  4. High risk medication use  -previously prescribed Vyvanse 40 mg daily , has not had a prescription since 3/2023.   -he is wanting to change this Rx from his previous PCP here. ROXANNA reviewed and is appropriate. Will be agreeable to take over this Rx if urine drug screen is appropriate.   -     Compliance Drug Analysis, Ur - Urine, Clean Catch    5. Primary hypertension  -at last visit increased losartan from 25 mg daily to 50 mg daily . Is checking BP at home and gets 120s-130/80s.   -BP acceptable control 132/80 today, continue to monitor  -recheck renal  function and electrolytes given recent increase in ARB    6. Jammed interphalangeal joint of finger of right hand, initial encounter  -right 4th finger injury approx 1.5 months ago while playing a sport   -exam as above  -offered referral to hand specialist given he has some residual pain with bending the finger but his ROM is grossly intact. He declines at this time. Recommend NSAID and finger splint otc but if no improvement in several weeks he should see a hand specialist           The following social determinates of health impact the patient's medical decision making: No social determinates of health were factored in to today's visit.     Follow Up  Return in about 3 months (around 3/7/2024) for Annual physical.    Patient/family had no further questions at this time and verbalized understanding of the plan discussed today.

## 2023-12-17 LAB — DRUGS UR: NORMAL

## 2023-12-19 ENCOUNTER — TELEPHONE (OUTPATIENT)
Dept: INTERNAL MEDICINE | Facility: CLINIC | Age: 48
End: 2023-12-19

## 2023-12-19 NOTE — TELEPHONE ENCOUNTER
"    Caller: Jefferson Chavez \"PA\"    Relationship: Self    Best call back number: [phone]     What medication are you requesting: VYVANSE       Have you had these symptoms before:    [] Yes  [] No    Have you been treated for these symptoms before:   [] Yes  [] No    If a prescription is needed, what is your preferred pharmacy and phone number: [facility] #[id number] [address] AT [facility] [address]  - [phone]  - [phone] FX     Additional notes: WAS TOLD HE HAD TO WAIT FOR LABS TO COME BACK IN ORDER TO GET PRESCRIPTION FILLED. WOULD LIKE TO KNOW IF PRESCRIPTION CAN BE FILLED NOW.         "

## 2023-12-20 DIAGNOSIS — F90.2 ATTENTION DEFICIT HYPERACTIVITY DISORDER (ADHD), COMBINED TYPE: Primary | ICD-10-CM

## 2023-12-20 RX ORDER — LISDEXAMFETAMINE DIMESYLATE CAPSULES 40 MG/1
40 CAPSULE ORAL EVERY MORNING
Qty: 30 CAPSULE | Refills: 0 | Status: SHIPPED | OUTPATIENT
Start: 2023-12-20

## 2023-12-26 LAB
ALBUMIN SERPL-MCNC: 4.7 G/DL (ref 4.1–5.1)
ALBUMIN/GLOB SERPL: 1.8 {RATIO} (ref 1.2–2.2)
ALP SERPL-CCNC: 101 IU/L (ref 44–121)
ALT SERPL-CCNC: 26 IU/L (ref 0–44)
AST SERPL-CCNC: 23 IU/L (ref 0–40)
BILIRUB SERPL-MCNC: 1.2 MG/DL (ref 0–1.2)
BUN SERPL-MCNC: 14 MG/DL (ref 6–24)
BUN/CREAT SERPL: 13 (ref 9–20)
CALCIUM SERPL-MCNC: 10 MG/DL (ref 8.7–10.2)
CHLORIDE SERPL-SCNC: 96 MMOL/L (ref 96–106)
CHOLEST SERPL-MCNC: 246 MG/DL (ref 100–199)
CITATION REF LAB TEST: NORMAL
CO2 SERPL-SCNC: 26 MMOL/L (ref 20–29)
CREAT SERPL-MCNC: 1.1 MG/DL (ref 0.76–1.27)
DNA SEQ VAR ID: NORMAL
EGFRCR SERPLBLD CKD-EPI 2021: 84 ML/MIN/1.73
ETHNIC BACKGROUND STATED: NORMAL
GENE DIS ANL CARRIER INTERP-IMP: NORMAL
GENE STUDIED ID: NORMAL
GENE STUDIED ID: NORMAL
GLOBULIN SER CALC-MCNC: 2.6 G/DL (ref 1.5–4.5)
GLUCOSE SERPL-MCNC: 103 MG/DL (ref 70–99)
HDLC SERPL-MCNC: 48 MG/DL
LAB DIRECTOR NAME PROVIDER: NORMAL
LDLC SERPL CALC-MCNC: 157 MG/DL (ref 0–99)
POTASSIUM SERPL-SCNC: 4.1 MMOL/L (ref 3.5–5.2)
PROT SERPL-MCNC: 7.3 G/DL (ref 6–8.5)
REASON FOR REFERRAL (NARRATIVE): NORMAL
RECOMMENDATION PATIENT DOC-IMP: NORMAL
REF LAB TEST METHOD: NORMAL
SERVICE CMNT-IMP: NORMAL
SODIUM SERPL-SCNC: 135 MMOL/L (ref 134–144)
SPECIMEN SOURCE: NORMAL
TRIGL SERPL-MCNC: 221 MG/DL (ref 0–149)
VLDLC SERPL CALC-MCNC: 41 MG/DL (ref 5–40)

## 2023-12-27 DIAGNOSIS — E78.2 MIXED HYPERLIPIDEMIA: ICD-10-CM

## 2023-12-27 RX ORDER — ROSUVASTATIN CALCIUM 40 MG/1
40 TABLET, COATED ORAL DAILY
Qty: 90 TABLET | Refills: 1 | Status: SHIPPED | OUTPATIENT
Start: 2023-12-27

## 2024-01-31 DIAGNOSIS — E78.2 MIXED HYPERLIPIDEMIA: ICD-10-CM

## 2024-01-31 DIAGNOSIS — F90.2 ATTENTION DEFICIT HYPERACTIVITY DISORDER (ADHD), COMBINED TYPE: ICD-10-CM

## 2024-01-31 RX ORDER — LOSARTAN POTASSIUM 50 MG/1
50 TABLET ORAL DAILY
Qty: 90 TABLET | Refills: 1 | Status: SHIPPED | OUTPATIENT
Start: 2024-01-31

## 2024-01-31 RX ORDER — LISDEXAMFETAMINE DIMESYLATE CAPSULES 40 MG/1
40 CAPSULE ORAL EVERY MORNING
Qty: 30 CAPSULE | Refills: 0 | Status: SHIPPED | OUTPATIENT
Start: 2024-01-31

## 2024-01-31 NOTE — TELEPHONE ENCOUNTER
"    Caller: Jefferson Chavez \"PA\"    Relationship: Self    Best call back number: [phone]     Requested Prescriptions:   Requested Prescriptions     Pending Prescriptions Disp Refills    lisdexamfetamine (Vyvanse) 40 MG capsule 30 capsule 0     Sig: Take 1 capsule by mouth Every Morning    losartan (Cozaar) 50 MG tablet 90 tablet 0     Sig: Take 1 tablet by mouth Daily.        Pharmacy where request should be sent: [facility] #[id number] [place] [address] [address]  [facility] [phone] [facility] [phone]      Last office visit with prescribing clinician: 12/7/2023   Last telemedicine visit with prescribing clinician: Visit date not found   Next office visit with prescribing clinician: 3/18/2024     Additional details provided by patient: PATIENT IS OUT OF MEDICATION    Does the patient have less than a 3 day supply:  [x] Yes  [] No    Would you like a call back once the refill request has been completed: [] Yes [x] No    If the office needs to give you a call back, can they leave a voicemail: [] Yes [x] No    Josué Plasencia Rep   01/31/24 14:30 EST   "

## 2024-07-17 ENCOUNTER — TELEPHONE (OUTPATIENT)
Dept: INTERNAL MEDICINE | Facility: CLINIC | Age: 49
End: 2024-07-17

## 2024-07-17 NOTE — TELEPHONE ENCOUNTER
"  Caller: Jefferson Chavez \"PA\"    Relationship: Self    Best call back number: [phone]       Which medication are you concerned about: A ANTIBIOTIC FOR A TICK BITE      What are your concerns:     PATIENT HAS HAD 3 TICK BITES ON HIS BACK AND THE LAST ONE LEFT A BIG DARIAN.  SINCE THIS TICK BITE HE HAS BEEN FEELING FATIGUED.        [facility] STORE #[id number] [place], [place] - [address] AT [facility] [address]  - [phone]  - [phone] FX       PLEASE ADVISE  "

## 2024-07-18 ENCOUNTER — LAB (OUTPATIENT)
Dept: LAB | Facility: HOSPITAL | Age: 49
End: 2024-07-18
Payer: COMMERCIAL

## 2024-07-18 ENCOUNTER — OFFICE VISIT (OUTPATIENT)
Dept: INTERNAL MEDICINE | Facility: CLINIC | Age: 49
End: 2024-07-18
Payer: COMMERCIAL

## 2024-07-18 ENCOUNTER — HOSPITAL ENCOUNTER (EMERGENCY)
Facility: HOSPITAL | Age: 49
Discharge: HOME | End: 2024-07-18
Attending: EMERGENCY MEDICINE
Payer: COMMERCIAL

## 2024-07-18 ENCOUNTER — APPOINTMENT (OUTPATIENT)
Dept: CT IMAGING | Facility: HOSPITAL | Age: 49
End: 2024-07-18
Payer: COMMERCIAL

## 2024-07-18 VITALS
HEART RATE: 101 BPM | BODY MASS INDEX: 22.59 KG/M2 | OXYGEN SATURATION: 98 % | WEIGHT: 176 LBS | TEMPERATURE: 98.4 F | SYSTOLIC BLOOD PRESSURE: 140 MMHG | DIASTOLIC BLOOD PRESSURE: 100 MMHG | HEIGHT: 74 IN

## 2024-07-18 VITALS
DIASTOLIC BLOOD PRESSURE: 110 MMHG | RESPIRATION RATE: 18 BRPM | TEMPERATURE: 98.5 F | OXYGEN SATURATION: 97 % | SYSTOLIC BLOOD PRESSURE: 157 MMHG | HEART RATE: 78 BPM

## 2024-07-18 DIAGNOSIS — R53.83 FATIGUE, UNSPECIFIED TYPE: ICD-10-CM
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
## 2024-07-18 DIAGNOSIS — R00.0 TACHYCARDIA: ICD-10-CM

## 2024-07-18 DIAGNOSIS — I72.3 ILIAC ARTERY ANEURYSM, BILATERAL: ICD-10-CM

## 2024-07-18 DIAGNOSIS — M79.10 MYALGIA: ICD-10-CM

## 2024-07-18 DIAGNOSIS — F10.10 ALCOHOL ABUSE: ICD-10-CM

## 2024-07-18 DIAGNOSIS — R11.2 NAUSEA AND VOMITING, UNSPECIFIED VOMITING TYPE: Primary | ICD-10-CM

## 2024-07-18 DIAGNOSIS — R74.01 TRANSAMINITIS: ICD-10-CM

## 2024-07-18 DIAGNOSIS — R11.11 VOMITING WITHOUT NAUSEA, UNSPECIFIED VOMITING TYPE: ICD-10-CM

## 2024-07-18 DIAGNOSIS — R01.1 HEART MURMUR: ICD-10-CM

## 2024-07-18 DIAGNOSIS — R50.9 SUBJECTIVE FEVER: Primary | ICD-10-CM

## 2024-07-18 LAB
ALBUMIN SERPL-MCNC: 5.2 G/DL (ref 3.5–5.2)
ALBUMIN/GLOB SERPL: 2 G/DL
ALP SERPL-CCNC: 110 U/L (ref 39–117)
ALT SERPL W P-5'-P-CCNC: 133 U/L (ref 1–41)
ANION GAP SERPL CALCULATED.3IONS-SCNC: 16 MMOL/L (ref 5–15)
AST SERPL-CCNC: 254 U/L (ref 1–40)
BASOPHILS # BLD AUTO: 0.03 10*3/MM3 (ref 0–0.2)
BASOPHILS NFR BLD AUTO: 0.5 % (ref 0–1.5)
BILIRUB SERPL-MCNC: 3 MG/DL (ref 0–1.2)
BUN SERPL-MCNC: 20 MG/DL (ref 6–20)
BUN/CREAT SERPL: 23.5 (ref 7–25)
CALCIUM SPEC-SCNC: 10.8 MG/DL (ref 8.6–10.5)
CHLORIDE SERPL-SCNC: 90 MMOL/L (ref 98–107)
CK SERPL-CCNC: 164 U/L (ref 20–200)
CO2 SERPL-SCNC: 26 MMOL/L (ref 22–29)
CREAT SERPL-MCNC: 0.85 MG/DL (ref 0.76–1.27)
CRP SERPL-MCNC: <0.3 MG/DL (ref 0–0.5)
D-LACTATE SERPL-SCNC: 2 MMOL/L (ref 0.5–2)
DEPRECATED RDW RBC AUTO: 47.7 FL (ref 37–54)
EGFRCR SERPLBLD CKD-EPI 2021: 107.9 ML/MIN/1.73
EOSINOPHIL # BLD AUTO: 0 10*3/MM3 (ref 0–0.4)
EOSINOPHIL NFR BLD AUTO: 0 % (ref 0.3–6.2)
ERYTHROCYTE [DISTWIDTH] IN BLOOD BY AUTOMATED COUNT: 13.2 % (ref 12.3–15.4)
ERYTHROCYTE [SEDIMENTATION RATE] IN BLOOD: 4 MM/HR (ref 0–15)
ETHANOL BLD-MCNC: <10 MG/DL (ref 0–10)
ETHANOL UR QL: <0.01 %
GLOBULIN UR ELPH-MCNC: 2.6 GM/DL
GLUCOSE SERPL-MCNC: 112 MG/DL (ref 65–99)
HCT VFR BLD AUTO: 44.5 % (ref 37.5–51)
HGB BLD-MCNC: 15.1 G/DL (ref 13–17.7)
IMM GRANULOCYTES # BLD AUTO: 0.02 10*3/MM3 (ref 0–0.05)
IMM GRANULOCYTES NFR BLD AUTO: 0.3 % (ref 0–0.5)
LIPASE SERPL-CCNC: 77 U/L (ref 13–60)
LYMPHOCYTES # BLD AUTO: 0.61 10*3/MM3 (ref 0.7–3.1)
LYMPHOCYTES NFR BLD AUTO: 9.2 % (ref 19.6–45.3)
MAGNESIUM SERPL-MCNC: 1.9 MG/DL (ref 1.6–2.6)
MCH RBC QN AUTO: 32.8 PG (ref 26.6–33)
MCHC RBC AUTO-ENTMCNC: 33.9 G/DL (ref 31.5–35.7)
MCV RBC AUTO: 96.7 FL (ref 79–97)
MONOCYTES # BLD AUTO: 0.96 10*3/MM3 (ref 0.1–0.9)
MONOCYTES NFR BLD AUTO: 14.5 % (ref 5–12)
NEUTROPHILS NFR BLD AUTO: 5 10*3/MM3 (ref 1.7–7)
NEUTROPHILS NFR BLD AUTO: 75.5 % (ref 42.7–76)
NRBC BLD AUTO-RTO: 0 /100 WBC (ref 0–0.2)
PLATELET # BLD AUTO: 105 10*3/MM3 (ref 140–450)
PMV BLD AUTO: 10.9 FL (ref 6–12)
POTASSIUM SERPL-SCNC: 4 MMOL/L (ref 3.5–5.2)
PROT SERPL-MCNC: 7.8 G/DL (ref 6–8.5)
RBC # BLD AUTO: 4.6 10*6/MM3 (ref 4.14–5.8)
SODIUM SERPL-SCNC: 132 MMOL/L (ref 136–145)
T4 FREE SERPL-MCNC: 1.56 NG/DL (ref 0.92–1.68)
TSH SERPL DL<=0.05 MIU/L-ACNC: 4.29 UIU/ML (ref 0.27–4.2)
WBC NRBC COR # BLD AUTO: 6.62 10*3/MM3 (ref 3.4–10.8)

## 2024-07-18 PROCEDURE — 83690 ASSAY OF LIPASE: CPT | Performed by: STUDENT IN AN ORGANIZED HEALTH CARE EDUCATION/TRAINING PROGRAM

## 2024-07-18 PROCEDURE — 74177 CT ABD & PELVIS W/CONTRAST: CPT

## 2024-07-18 PROCEDURE — 87040 BLOOD CULTURE FOR BACTERIA: CPT | Performed by: STUDENT IN AN ORGANIZED HEALTH CARE EDUCATION/TRAINING PROGRAM

## 2024-07-18 PROCEDURE — 82550 ASSAY OF CK (CPK): CPT | Performed by: PHYSICIAN ASSISTANT

## 2024-07-18 PROCEDURE — 83605 ASSAY OF LACTIC ACID: CPT | Performed by: PHYSICIAN ASSISTANT

## 2024-07-18 PROCEDURE — 85652 RBC SED RATE AUTOMATED: CPT | Performed by: STUDENT IN AN ORGANIZED HEALTH CARE EDUCATION/TRAINING PROGRAM

## 2024-07-18 PROCEDURE — 25810000003 SODIUM CHLORIDE 0.9 % SOLUTION: Performed by: PHYSICIAN ASSISTANT

## 2024-07-18 PROCEDURE — 93000 ELECTROCARDIOGRAM COMPLETE: CPT | Performed by: STUDENT IN AN ORGANIZED HEALTH CARE EDUCATION/TRAINING PROGRAM

## 2024-07-18 PROCEDURE — 83735 ASSAY OF MAGNESIUM: CPT | Performed by: PHYSICIAN ASSISTANT

## 2024-07-18 PROCEDURE — 96375 TX/PRO/DX INJ NEW DRUG ADDON: CPT

## 2024-07-18 PROCEDURE — 99285 EMERGENCY DEPT VISIT HI MDM: CPT

## 2024-07-18 PROCEDURE — 25010000002 ONDANSETRON PER 1 MG: Performed by: PHYSICIAN ASSISTANT

## 2024-07-18 PROCEDURE — 25010000002 THIAMINE HCL 200 MG/2ML SOLUTION: Performed by: PHYSICIAN ASSISTANT

## 2024-07-18 PROCEDURE — 25510000001 IOPAMIDOL 61 % SOLUTION: Performed by: EMERGENCY MEDICINE

## 2024-07-18 PROCEDURE — 99214 OFFICE O/P EST MOD 30 MIN: CPT | Performed by: STUDENT IN AN ORGANIZED HEALTH CARE EDUCATION/TRAINING PROGRAM

## 2024-07-18 PROCEDURE — 96365 THER/PROPH/DIAG IV INF INIT: CPT

## 2024-07-18 PROCEDURE — 86140 C-REACTIVE PROTEIN: CPT | Performed by: STUDENT IN AN ORGANIZED HEALTH CARE EDUCATION/TRAINING PROGRAM

## 2024-07-18 PROCEDURE — 82077 ASSAY SPEC XCP UR&BREATH IA: CPT | Performed by: PHYSICIAN ASSISTANT

## 2024-07-18 PROCEDURE — 84439 ASSAY OF FREE THYROXINE: CPT | Performed by: STUDENT IN AN ORGANIZED HEALTH CARE EDUCATION/TRAINING PROGRAM

## 2024-07-18 RX ORDER — SODIUM CHLORIDE 9 MG/ML
1000 INJECTION, SOLUTION INTRAVENOUS ONCE
Status: COMPLETED | OUTPATIENT
Start: 2024-07-18 | End: 2024-07-18

## 2024-07-18 RX ORDER — SODIUM CHLORIDE 0.9 % (FLUSH) 0.9 %
10 SYRINGE (ML) INJECTION AS NEEDED
Status: DISCONTINUED | OUTPATIENT
Start: 2024-07-18 | End: 2024-07-18 | Stop reason: HOSPADM

## 2024-07-18 RX ORDER — THIAMINE HYDROCHLORIDE 100 MG/ML
200 INJECTION, SOLUTION INTRAMUSCULAR; INTRAVENOUS ONCE
Status: COMPLETED | OUTPATIENT
Start: 2024-07-18 | End: 2024-07-18

## 2024-07-18 RX ORDER — ONDANSETRON 2 MG/ML
4 INJECTION INTRAMUSCULAR; INTRAVENOUS ONCE
Status: COMPLETED | OUTPATIENT
Start: 2024-07-18 | End: 2024-07-18

## 2024-07-18 RX ORDER — ONDANSETRON 4 MG/1
4 TABLET, FILM COATED ORAL EVERY 12 HOURS PRN
Qty: 15 TABLET | Refills: 0 | Status: SHIPPED | OUTPATIENT
Start: 2024-07-18

## 2024-07-18 RX ORDER — SERTRALINE HYDROCHLORIDE 100 MG/1
100 TABLET, FILM COATED ORAL DAILY
Qty: 90 TABLET | Refills: 3 | OUTPATIENT
Start: 2024-07-18

## 2024-07-18 RX ADMIN — SODIUM CHLORIDE 1000 ML: 9 INJECTION, SOLUTION INTRAVENOUS at 17:33

## 2024-07-18 RX ADMIN — FOLIC ACID 1 MG: 5 INJECTION, SOLUTION INTRAMUSCULAR; INTRAVENOUS; SUBCUTANEOUS at 17:38

## 2024-07-18 RX ADMIN — THIAMINE HYDROCHLORIDE 200 MG: 100 INJECTION, SOLUTION INTRAMUSCULAR; INTRAVENOUS at 17:34

## 2024-07-18 RX ADMIN — IOPAMIDOL 85 ML: 612 INJECTION, SOLUTION INTRAVENOUS at 18:01

## 2024-07-18 RX ADMIN — ONDANSETRON 4 MG: 2 INJECTION INTRAMUSCULAR; INTRAVENOUS at 17:34

## 2024-07-18 NOTE — PROGRESS NOTES
"  Newton Sibley D.O.  Internal Medicine  [facility]  [address], [address]  [place] [address]  [phone]      Chief Complaint  Fatigue    SUBJECTIVE      Jefferson Chavez is a 49 y.o. male who presents to the office today as an established patient that last saw me on 12/7/2023.     Pt states in the  last 3 months has had 3 tick bites. The first was Around April or May 2024 and that was on the back on the right and he thought it was a scab and was on him for 4 days. Then a month later had a tick bite on the left buttocks and he noticed he felt fatigued around that time. \"Something was off a little bit\". There was a red blotched area of skin. Around July 5 or 6th started noticing he was even more tired. He has had fever or chills on and off during this time frame.  The last 5 or 6 nights he has had sweats and had to leave work early as well as had dry heaving with minimal amount of fluid expelled. He has had zero appetite. Last episode of vomiting was yesterday morning. He has had a small amount of abdominal pain around the belly button but hard to know because of his muscles being stretched from vomiting. No diarrhea. Initially he had pain in his knees and upper legs . No headache. No burning or pain with urination but states there were some days where he only urinated twice due to vomiting so much.    Today he states he is starting to feel better.     No Known Allergies     Outpatient Medications Marked as Taking for the 7/18/24 encounter (Office Visit) with Newton Sibley, DO   Medication Sig Dispense Refill    losartan (Cozaar) 50 MG tablet Take 1 tablet by mouth Daily. 90 tablet 1    rosuvastatin (CRESTOR) 40 MG tablet Take 1 tablet by mouth Daily. 90 tablet 1    sertraline (ZOLOFT) 100 MG tablet TAKE 1 TABLET BY MOUTH DAILY 90 tablet 3        Past Medical History:   Diagnosis Date    ADHD     Alcohol use disorder     Alcoholic steatohepatitis     Anxiety     Heartburn     " "Hyperlipidemia     Hypertension     SELENA (obstructive sleep apnea)     Subclinical hypothyroidism        OBJECTIVE    Vital Signs:   /100   Pulse 101   Temp 98.4 °F (36.9 °C) (Oral)   Ht 186.7 cm (73.5\")   Wt 79.8 kg (176 lb)   SpO2 98%   BMI 22.91 kg/m²        Physical Exam  Vitals reviewed.   Constitutional:       General: He is not in acute distress.     Appearance: He is not ill-appearing.   HENT:      Head: Normocephalic and atraumatic.   Eyes:      General: No scleral icterus.  Cardiovascular:      Rate and Rhythm: Regular rhythm. Tachycardia present.      Heart sounds: Murmur (at the apex) heard.      Systolic murmur is present with a grade of 2/6.   Pulmonary:      Effort: Pulmonary effort is normal. No respiratory distress.      Breath sounds: Normal breath sounds. No wheezing.   Abdominal:      General: Bowel sounds are normal. There is no distension.      Palpations: Abdomen is soft.      Tenderness: There is no abdominal tenderness. There is no guarding.   Skin:     Coloration: Skin is not jaundiced.          Neurological:      Mental Status: He is alert.   Psychiatric:         Mood and Affect: Mood normal.         Thought Content: Thought content normal.      Comments: Slightly jittery, nervous appearing                       ECG 12 Lead    Date/Time: 7/18/2024 1:05 PM  Performed by: Newton Sibley DO    Authorized by: Newton Sibley DO  Comparison: compared with previous ECG from 1/23/2017  Similar to previous ECG  Rhythm: sinus rhythm  Rate: normal  Rate comments: 73  Conduction: incomplete right bundle branch block  ST Segments: ST segments normal  T Waves: T waves normal  QRS axis: normal  Other: no other findings    Clinical impression: non-specific ECG             ASSESSMENT & PLAN     Diagnoses and all orders for this visit:    1. Subjective fever (Primary)  2. Fatigue, unspecified type  3. Tick bite, unspecified site, initial encounter  4. Myalgia  5. Vomiting without nausea, unspecified " vomiting type  6. Heart murmur  7. Tachycardia  -Patient presents for acute care visit as described above.  Overall with 3 months of symptoms That are rather subjective and nonspecific  in nature and include fatigue, subjective fever and chills, feeling off, episodic vomiting and episodic myalgias in the hips and knees.  He reports 3 tick bites over this period of time with none of them having the characteristic bull's-eye rash.  He states 1 tick bite on the lower back had a red blotchy appearance and he showed me an image of that on his phone and it does not appear consistent with a typical Lyme disease rash.  On exam, he is slightly hypertensive, initial heart rate 101, afebrile, 90% on room air.  Physical exam is normal aside from a new 2 out of 6 murmur at the apex.  This has not been heard on previous exams.  He also complains of some vague periumbilical abdominal pain that is not of concern to him.  He states he has started drinking alcohol again.  At this point differential remains very broad.  I am concerned with the subjective fevers and fatigue and new heart murmur that he could have infective endocarditis.  Of course states of dehydration can also be associated with exacerbation of murmur.  I am going to obtain labs as below including blood cultures ,inflammatory markers and CBC and CMP.  EKG in office shows incomplete right bundle branch block which appears chronic with sinus rhythm and normal rate.  I would attempt to get coverage for a stat TTE.  I will prescribe Zofran for symptomatic vomiting treatment though this does seem to be improving. Obtain tick panel as well.  Further plan depending upon result.  Alert us with any new symptoms immediately.        -     C-reactive protein  -     CBC & Differential  -     Comprehensive Metabolic Panel  -     Lipase  -     Sedimentation rate, automated  -     Tick Panel  -     TSH Rfx On Abnormal To Free T4  -     Blood Culture - Blood,  -     Blood Culture -  Blood,  -     ECG 12 Lead              Follow Up  No follow-ups on file.    Patient/family had no further questions at this time and verbalized understanding of the plan discussed today.

## 2024-07-18 NOTE — ED PROVIDER NOTES
EMERGENCY DEPARTMENT MD ATTESTATION NOTE    Room Number:  01/01  PCP: Newton Sibley DO  Independent Historians: Patient    HPI:  A complete HPI/ROS/PMH/PSH/SH/FH are unobtainable due to: None    Chronic or social conditions impacting patient care (Social Determinants of Health): None      Context: Jefferson Chavez is a 49 y.o. male with a medical history of hypertension, ADHD and alcohol abuse who presents to the ED c/o acute repeated episodes of nausea and vomiting with some generalized malaise for the past several days.  Patient reports an unintentional weight loss with poor use poor appetite and poor food and liquid intolerance.  He does admit to recent increase in alcohol intake over the past few weeks.  Denies fevers.  Denies any blood per rectum or blood with vomiting.  Additionally, over the past couple of months he has had 3 separate tick bites and he did have 1 red rash at the site of one of the tick bites but it was not a target lesion.  He went to see his PCP in the office today and was found to have elevated liver function test.  Therefore he was advised to come here for further consultation.  Patient denies any abdominal pain at this time.        Review of prior external notes (non-ED) -and- Review of prior external test results outside of this encounter: I independently reviewed the internal medicine progress note from earlier today, July 17, 2024 when patient was evaluated for the same symptoms.  Record indicates planned for outpatient transthoracic echocardiogram study to be performed    Prescription drug monitoring program review: ROXANNA reviewed by Francis Brown MD, Elliot Molina MD         PHYSICAL EXAM    I have reviewed the triage vital signs and nursing notes.    ED Triage Vitals [07/18/24 1637]   Temp Heart Rate Resp BP SpO2   98.5 °F (36.9 °C) (!) 123 18 125/91 95 %      Temp src Heart Rate Source Patient Position BP Location FiO2 (%)   Tympanic Monitor Lying Right arm --       Physical  Exam  GENERAL: alert, no acute distress, normal habitus  SKIN: Warm, dry, no rashes  HENT: Normocephalic, atraumatic, mucous membranes somewhat dry  EYES: no scleral icterus, normal conjunctiva  CV: regular rhythm, regular rate during my exam, normal perfusion  RESPIRATORY: normal effort, lungs clear, no stridor  ABDOMEN: soft, nontender, nondistended  MUSCULOSKELETAL: no deformity, no edema or asymmetry to the extremities  NEURO: alert, moves all extremities, follows commands        MEDICATIONS GIVEN IN ER  Medications   ondansetron (ZOFRAN) injection 4 mg (4 mg Intravenous Given 7/18/24 1734)   sodium chloride 0.9 % infusion 1,000 mL (0 mL Intravenous Stopped 7/18/24 1903)   folic acid 1 mg in sodium chloride 0.9 % 50 mL IVPB (0 mg Intravenous Stopped 7/18/24 1808)   thiamine (B-1) injection 200 mg (200 mg Intravenous Given 7/18/24 1734)   iopamidol (ISOVUE-300) 61 % injection 100 mL (85 mL Intravenous Given 7/18/24 1801)         ORDERS PLACED DURING THIS VISIT:  Orders Placed This Encounter   Procedures    CT Abdomen Pelvis With Contrast    CK    Magnesium    Lactic Acid, Plasma    Ethanol         PROCEDURES  Procedures            PROGRESS, DATA ANALYSIS, CONSULTS, AND MEDICAL DECISION MAKING  All labs have been independently interpreted by me.  All radiology studies have been reviewed by me. All EKG's have been independently viewed and interpreted by me.  Discussion below represents my analysis of pertinent findings related to patient's condition, differential diagnosis, treatment plan and final disposition.    Differential diagnosis includes but is not limited to alcoholic hepatitis, dehydration, tickborne illness, cirrhosis,. Alcohol withdrawal    Clinical Scores:                   ED Course as of 07/18/24 2234   Thu Jul 18, 2024   1659 Patient presents from home with diaphoresis, malaise, vomiting.  Patient evaluated by primary care doctor earlier today.  He was sent to the ER for further concern of his  "tachycardia, heart murmur, abnormal labs.  Differential diagnoses include not limited to alcohol withdrawal, tickborne illness, pancreatitis. [EE]   1816 Magnesium: 1.9 [EE]   1816 Lactate: 2.0 [EE]   1945 Updated patient on workup.  Patient states he feels much better after IV fluids.  Heart rate normal.  No clinical evidence of withdrawal symptoms.  I believe he is safe to go home but it is very important for him to abstain from alcohol.  The patient states that this is planned.  He does have appropriate follow-up with his primary care doctor.  He has an echocardiogram planned for tomorrow.  He has blood pressure medicine and Zofran at home which he will take.  He understands to follow-up with vascular surgery for the iliac artery aneurysms. [EE]   1946 CT imaging of the abdomen independently interpreted myself shows no obvious pancreatitis. [EE]      ED Course User Index  [EE] Ace Ortiz PA       MDM:   I independently interpreted the abdomen CT study and my findings are: No free air, no small bowel obstruction pattern    I reviewed the labs that were obtained earlier today by the PCP and I have also reviewed the labs resulted today in our ED visit.  We observed this patient for several hours in the department.  He is not actively vomiting.  There is no evidence of bleeding.  His heart rate is normal on exam now.  He does not seem to be exhibiting any signs of alcohol withdrawal from a clinical standpoint.  The CT abdomen and pelvis scan is rather reassuring without any acute emergent findings.  Patient made it clear that he would prefer to go home for tonight and then follow-up with his scheduled echocardiogram procedure tomorrow morning.  I think this plan is quite reasonable given his overall clinical appearance right now.  Will review with him the usual \"return to ER\" instructions prior to discharge.    COMPLEXITY OF CARE  Admission was considered but after careful review of the patient's presentation, " physical examination, diagnostic results, and response to treatment the patient may be safely discharged with outpatient follow-up.    Please note that portions of this document were completed with a voice recognition program.    Note Disclaimer: At [facility], we believe that sharing information builds trust and better relationships. You are receiving this note because you recently visited [facility]. It is possible you will see health information before a provider has talked with you about it. This kind of information can be easy to misunderstand. To help you fully understand what it means for your health, we urge you to discuss this note with your provider.         Elliot Molina MD  07/18/24 2797

## 2024-07-18 NOTE — ED NOTES
Pt reports abnormal labs. Pt also c/o vomiting and his doctor ordered labs. Pt has elevated liver enzymes. Hx alcohol use

## 2024-07-18 NOTE — ED PROVIDER NOTES
EMERGENCY DEPARTMENT ENCOUNTER    Room Number:  01/01  Date of encounter:  7/18/2024  PCP: Newton Sibley DO  Historian: Patient, father  Chronic or social conditions impacting care (social determinants of health): None    HPI:  Chief Complaint: Malaise, diaphoresis  A complete HPI/ROS/PMH/PSH/SH/FH are unobtainable due to: Nothing    Context: Jefferson Chavez is a 49 y.o. male with a history of alcohol abuse, hypertension, ADHD, who presents to the ED c/o acute malaise, diaphoresis, tachycardia, vomiting.  Patient states that he began to feel very ill 2 weeks ago.  He reports vomiting multiple times daily for 5 or 6 days.  He last threw up yesterday.  He denies any significant abdominal pain.  He reports an approximate 20 pound weight loss in the past 10 days.  Patient does admit to drinking heavily approximately 2 to 3 weeks ago.  He is a chronic alcoholic.  He states he has not been drinking as much the past several days.  Patient denies any previous history of alcohol withdrawal, seizures.    He also reports several tick bites over the past 2 to 3 months.  He denies any target lesions, myalgias.    He saw his primary care doctor today who rhianna several labs.  Patient was found to have a new murmur, tachycardia.  He was sent to the ER for further evaluation.    Review of prior external notes (non-ED):   I reviewed primary care office visit from earlier today.  Patient seen for diaphoresis, vomiting.    Review of prior external test results outside of this encounter:  I reviewed a CMP from earlier today.  Bilirubin 3.0, , .    PAST MEDICAL HISTORY  Active Ambulatory Problems     Diagnosis Date Noted    Anxiety     Elevated liver enzymes 05/27/2022    Hypercholesterolemia 05/27/2022    Primary hypertension 05/27/2022    Attention deficit hyperactivity disorder (ADHD), combined type 05/27/2022    Alcohol intake above recommended sensible limits 05/27/2022    Annual physical exam 05/27/2022     Resolved  Ambulatory Problems     Diagnosis Date Noted    No Resolved Ambulatory Problems     Past Medical History:   Diagnosis Date    ADHD     Alcohol use disorder     Alcoholic steatohepatitis     Heartburn     Hyperlipidemia     Hypertension     SELENA (obstructive sleep apnea)     Subclinical hypothyroidism          PAST SURGICAL HISTORY  Past Surgical History:   Procedure Laterality Date    VASECTOMY      WISDOM TOOTH EXTRACTION           FAMILY HISTORY  Family History   Problem Relation Age of Onset    Heart disease Mother     Heart attack Mother     Hypertension Mother     Skin cancer Mother     No Known Problems Father     Ulcerative colitis Sister     Anxiety disorder Brother     Cancer Maternal Grandfather     Cancer Maternal Uncle     Anxiety disorder Other     Colon cancer Neg Hx     Colon polyps Neg Hx     Crohn's disease Neg Hx     Irritable bowel syndrome Neg Hx          SOCIAL HISTORY  Social History     Socioeconomic History    Marital status:    Tobacco Use    Smoking status: Former     Current packs/day: 0.00     Average packs/day: 0.5 packs/day for 2.0 years (1.0 ttl pk-yrs)     Types: Cigarettes     Start date:      Quit date:      Years since quittin.    Smokeless tobacco: Never   Substance and Sexual Activity    Alcohol use: Not Currently     Comment: previously heavy use    Drug use: Not Currently     Types: Marijuana         ALLERGIES  Patient has no known allergies.        REVIEW OF SYSTEMS  All systems reviewed and negative except for those discussed in HPI.       PHYSICAL EXAM    I have reviewed the triage vital signs and nursing notes.    ED Triage Vitals [24 1637]   Temp Heart Rate Resp BP SpO2   98.5 °F (36.9 °C) (!) 123 18 125/91 95 %      Temp src Heart Rate Source Patient Position BP Location FiO2 (%)   Tympanic Monitor Lying Right arm --       Physical Exam  GENERAL: Alert, oriented, anxious, not distressed  HENT: head atraumatic, no nuchal rigidity  EYES: no scleral  icterus, EOMI  CV: regular rhythm, tachycardic rate, no murmur  RESPIRATORY: normal effort, CTA  ABDOMEN: soft, nontender  MUSCULOSKELETAL: no deformity, FROM, no calf swelling or tenderness  NEURO: alert, moves all extremities, follows commands  SKIN: warm, diaphoretic        LAB RESULTS  Recent Results (from the past 24 hour(s))   C-reactive protein    Collection Time: 07/18/24  2:14 PM    Specimen: Blood   Result Value Ref Range    C-Reactive Protein <0.30 0.00 - 0.50 mg/dL   Comprehensive Metabolic Panel    Collection Time: 07/18/24  2:14 PM    Specimen: Blood   Result Value Ref Range    Glucose 112 (H) 65 - 99 mg/dL    BUN 20 6 - 20 mg/dL    Creatinine 0.85 0.76 - 1.27 mg/dL    Sodium 132 (L) 136 - 145 mmol/L    Potassium 4.0 3.5 - 5.2 mmol/L    Chloride 90 (L) 98 - 107 mmol/L    CO2 26.0 22.0 - 29.0 mmol/L    Calcium 10.8 (H) 8.6 - 10.5 mg/dL    Total Protein 7.8 6.0 - 8.5 g/dL    Albumin 5.2 3.5 - 5.2 g/dL    ALT (SGPT) 133 (H) 1 - 41 U/L    AST (SGOT) 254 (H) 1 - 40 U/L    Alkaline Phosphatase 110 39 - 117 U/L    Total Bilirubin 3.0 (H) 0.0 - 1.2 mg/dL    Globulin 2.6 gm/dL    A/G Ratio 2.0 g/dL    BUN/Creatinine Ratio 23.5 7.0 - 25.0    Anion Gap 16.0 (H) 5.0 - 15.0 mmol/L    eGFR 107.9 >60.0 mL/min/1.73   Lipase    Collection Time: 07/18/24  2:14 PM    Specimen: Blood   Result Value Ref Range    Lipase 77 (H) 13 - 60 U/L   Sedimentation rate, automated    Collection Time: 07/18/24  2:14 PM    Specimen: Blood   Result Value Ref Range    Sed Rate 4 0 - 15 mm/hr   TSH Rfx On Abnormal To Free T4    Collection Time: 07/18/24  2:14 PM    Specimen: Blood   Result Value Ref Range    TSH 4.290 (H) 0.270 - 4.200 uIU/mL   CBC Auto Differential    Collection Time: 07/18/24  2:14 PM    Specimen: Blood   Result Value Ref Range    WBC 6.62 3.40 - 10.80 10*3/mm3    RBC 4.60 4.14 - 5.80 10*6/mm3    Hemoglobin 15.1 13.0 - 17.7 g/dL    Hematocrit 44.5 37.5 - 51.0 %    MCV 96.7 79.0 - 97.0 fL    MCH 32.8 26.6 - 33.0 pg     MCHC 33.9 31.5 - 35.7 g/dL    RDW 13.2 12.3 - 15.4 %    RDW-SD 47.7 37.0 - 54.0 fl    MPV 10.9 6.0 - 12.0 fL    Platelets 105 (L) 140 - 450 10*3/mm3    Neutrophil % 75.5 42.7 - 76.0 %    Lymphocyte % 9.2 (L) 19.6 - 45.3 %    Monocyte % 14.5 (H) 5.0 - 12.0 %    Eosinophil % 0.0 (L) 0.3 - 6.2 %    Basophil % 0.5 0.0 - 1.5 %    Immature Grans % 0.3 0.0 - 0.5 %    Neutrophils, Absolute 5.00 1.70 - 7.00 10*3/mm3    Lymphocytes, Absolute 0.61 (L) 0.70 - 3.10 10*3/mm3    Monocytes, Absolute 0.96 (H) 0.10 - 0.90 10*3/mm3    Eosinophils, Absolute 0.00 0.00 - 0.40 10*3/mm3    Basophils, Absolute 0.03 0.00 - 0.20 10*3/mm3    Immature Grans, Absolute 0.02 0.00 - 0.05 10*3/mm3    nRBC 0.0 0.0 - 0.2 /100 WBC   T4, Free    Collection Time: 07/18/24  2:14 PM    Specimen: Blood   Result Value Ref Range    Free T4 1.56 0.92 - 1.68 ng/dL   CK    Collection Time: 07/18/24  5:31 PM    Specimen: Blood   Result Value Ref Range    Creatine Kinase 164 20 - 200 U/L   Magnesium    Collection Time: 07/18/24  5:31 PM    Specimen: Blood   Result Value Ref Range    Magnesium 1.9 1.6 - 2.6 mg/dL   Lactic Acid, Plasma    Collection Time: 07/18/24  5:31 PM    Specimen: Blood   Result Value Ref Range    Lactate 2.0 0.5 - 2.0 mmol/L   Ethanol    Collection Time: 07/18/24  5:31 PM    Specimen: Blood   Result Value Ref Range    Ethanol <10 0 - 10 mg/dL    Ethanol % <0.010 %       Ordered the above labs and independently reviewed the results.        RADIOLOGY  CT Abdomen Pelvis With Contrast    Result Date: 7/18/2024  CT ABDOMEN PELVIS W CONTRAST-  DATE OF EXAM: 7/18/2024 5:39 PM  INDICATION: Vomiting. Abnormal LFTs. Malaise. Weight loss.  COMPARISON: 9/18/2021.  TECHNIQUE: Multiple contiguous axial images were acquired through the abdomen and pelvis following the intravenous administration of 85 mL of Isovue-300. Reformatted coronal and sagittal sequences were also reviewed. Radiation dose reduction techniques were utilized, including automated exposure  control and exposure modulation based on body size.  FINDINGS: Included lung bases are clear.  Diffusely decreased hepatic attenuation, consistent with hepatic steatosis. The gallbladder, spleen, pancreas, adrenal glands, and left kidney are unremarkable. The small 1 cm exophytic cyst at the upper pole of the right kidney is now increased in attenuation, likely representing a hemorrhagic or proteinaceous cyst. The right kidney is otherwise unremarkable. The urinary bladder is nondistended. Mild urinary bladder wall thickening is likely accentuated by under distention.  Mild colonic stool. No bowel obstruction or significant bowel wall thickening. The appendix is normal.  No free fluid in the abdomen or pelvis. No free intraperitoneal air. No pathologically enlarged lymph nodes in the abdomen or pelvis. Mild to moderate calcified atherosclerotic disease in the abdominal aorta and its distal branches with a fusiform 2.2 cm proximal right common iliac artery aneurysm and a fusiform 1.8 cm mid left common iliac artery aneurysm.  Small fat-containing right inguinal hernia. Mild to moderate multilevel lumbar spondylosis with likely chronic/degenerative grade 1 retrolisthesis of L3 on L4, L4 on L5, and L5 on S1. Mild to moderate bilateral hip joint DJD. No acute osseous abnormality or concerning osseous lesion.       1. Diffusely decreased hepatic attenuation, consistent with hepatic steatosis. 2. Increased attenuation of the 1 cm exophytic cyst at the upper pole of the right kidney, probable proteinaceous or hemorrhagic cyst. 3. Mild urinary bladder wall thickening is likely accentuated by under distention. Consider correlating with urinalysis. 4. Mild to moderate calcified atherosclerotic disease in the abdominal aorta and its distal branches with a fusiform 2.2 cm right common iliac artery aneurysm and a fusiform 1.8 cm left common iliac artery aneurysm. 5. Mild to moderate multilevel lumbar spondylosis and mild  multilevel spondylolisthesis.  This report was finalized on 7/18/2024 7:00 PM by Vignesh Mistry MD on Workstation: [id number]       I ordered the above noted radiological studies. Reviewed by me and discussed with radiologist.  See dictation for official radiology interpretation.      MEDICATIONS GIVEN IN ER    Medications   sodium chloride 0.9 % flush 10 mL (has no administration in time range)   ondansetron (ZOFRAN) injection 4 mg (4 mg Intravenous Given 7/18/24 1734)   sodium chloride 0.9 % infusion 1,000 mL (0 mL Intravenous Stopped 7/18/24 1903)   folic acid 1 mg in sodium chloride 0.9 % 50 mL IVPB (0 mg Intravenous Stopped 7/18/24 1808)   thiamine (B-1) injection 200 mg (200 mg Intravenous Given 7/18/24 1734)   iopamidol (ISOVUE-300) 61 % injection 100 mL (85 mL Intravenous Given 7/18/24 1801)         ADDITIONAL ORDERS CONSIDERED BUT NOT ORDERED:  Admission was considered but after careful review of the patient's presentation, physical examination, diagnostic results, and response to treatment the patient may be safely discharged with outpatient follow-up.       PROGRESS, DATA ANALYSIS, CONSULTS, AND MEDICAL DECISION MAKING    All labs have been independently interpreted by myself.  All radiology studies have been independently interpreted by myself and discussed with radiologist dictating the report.   EKG's independently interpreted by myself.  Discussion below represents my analysis of pertinent findings related to patient's condition, differential diagnosis, treatment plan and final disposition.    I have discussed case with Dr. Molina, emergency room physician.  He has performed his own bedside examination and agrees with treatment plan.    ED Course as of 07/18/24 2151   Thu Jul 18, 2024   1659 Patient presents from home with diaphoresis, malaise, vomiting.  Patient evaluated by primary care doctor earlier today.  He was sent to the ER for further concern of his tachycardia, heart murmur, abnormal labs.   Differential diagnoses include not limited to alcohol withdrawal, tickborne illness, pancreatitis. [EE]   1816 Magnesium: 1.9 [EE]   1816 Lactate: 2.0 [EE]   1945 Updated patient on workup.  Patient states he feels much better after IV fluids.  Heart rate normal.  No clinical evidence of withdrawal symptoms.  I believe he is safe to go home but it is very important for him to abstain from alcohol.  The patient states that this is planned.  He does have appropriate follow-up with his primary care doctor.  He has an echocardiogram planned for tomorrow.  He has blood pressure medicine and Zofran at home which he will take.  He understands to follow-up with vascular surgery for the iliac artery aneurysms. [EE]   1946 CT imaging of the abdomen independently interpreted myself shows no obvious pancreatitis. [EE]      ED Course User Index  [EE] Ace Ortiz PA       AS OF 21:51 EDT VITALS:    BP - (!) 157/110  HR - 78  TEMP - 98.5 °F (36.9 °C) (Tympanic)  O2 SATS - 97%        DIAGNOSIS  Final diagnoses:   Nausea and vomiting, unspecified vomiting type   Alcohol abuse   Transaminitis   Iliac artery aneurysm, bilateral         DISPOSITION  Discharged    Admission was considered but after careful review of the patient's presentation, physical examination, diagnostic results, and response to treatment the patient may be safely discharged with outpatient follow-up.         Dictated utilizing Dragon dictation     Ace Ortiz PA  07/18/24 4070

## 2024-07-19 ENCOUNTER — HOSPITAL ENCOUNTER (OUTPATIENT)
Dept: CARDIOLOGY | Facility: HOSPITAL | Age: 49
Discharge: HOME | End: 2024-07-19
Admitting: STUDENT IN AN ORGANIZED HEALTH CARE EDUCATION/TRAINING PROGRAM
Payer: COMMERCIAL

## 2024-07-19 VITALS — BODY MASS INDEX: 23.33 KG/M2 | HEIGHT: 73 IN | WEIGHT: 176 LBS

## 2024-07-19 DIAGNOSIS — R01.1 HEART MURMUR: ICD-10-CM

## 2024-07-19 DIAGNOSIS — R50.9 SUBJECTIVE FEVER: ICD-10-CM

## 2024-07-19 LAB
B BURGDOR IGG+IGM SER QL IA: NEGATIVE
BH CV ECHO MEAS - ACS: 2.38 CM
BH CV ECHO MEAS - AO MAX PG: 9 MMHG
BH CV ECHO MEAS - AO MEAN PG: 4.7 MMHG
BH CV ECHO MEAS - AO ROOT DIAM: 3.4 CM
BH CV ECHO MEAS - AO V2 MAX: 150.1 CM/SEC
BH CV ECHO MEAS - AO V2 VTI: 20.3 CM
BH CV ECHO MEAS - AVA(I,D): 2.47 CM2
BH CV ECHO MEAS - EDV(CUBED): 155.9 ML
BH CV ECHO MEAS - EDV(MOD-SP2): 62 ML
BH CV ECHO MEAS - EDV(MOD-SP4): 55 ML
BH CV ECHO MEAS - EF(MOD-BP): 58.9 %
BH CV ECHO MEAS - EF(MOD-SP2): 58.1 %
BH CV ECHO MEAS - EF(MOD-SP4): 54.5 %
BH CV ECHO MEAS - ESV(CUBED): 37.6 ML
BH CV ECHO MEAS - ESV(MOD-SP2): 26 ML
BH CV ECHO MEAS - ESV(MOD-SP4): 25 ML
BH CV ECHO MEAS - FS: 37.8 %
BH CV ECHO MEAS - IVS/LVPW: 0.91 CM
BH CV ECHO MEAS - IVSD: 0.79 CM
BH CV ECHO MEAS - LAT PEAK E' VEL: 12 CM/SEC
BH CV ECHO MEAS - LV DIASTOLIC VOL/BSA (35-75): 27 CM2
BH CV ECHO MEAS - LV MASS(C)D: 161.5 GRAMS
BH CV ECHO MEAS - LV MAX PG: 3.7 MMHG
BH CV ECHO MEAS - LV MEAN PG: 1.86 MMHG
BH CV ECHO MEAS - LV SYSTOLIC VOL/BSA (12-30): 12.3 CM2
BH CV ECHO MEAS - LV V1 MAX: 96.4 CM/SEC
BH CV ECHO MEAS - LV V1 VTI: 14 CM
BH CV ECHO MEAS - LVIDD: 5.4 CM
BH CV ECHO MEAS - LVIDS: 3.3 CM
BH CV ECHO MEAS - LVOT AREA: 3.6 CM2
BH CV ECHO MEAS - LVOT DIAM: 2.13 CM
BH CV ECHO MEAS - LVPWD: 0.87 CM
BH CV ECHO MEAS - MED PEAK E' VEL: 11.7 CM/SEC
BH CV ECHO MEAS - MV A DUR: 0.11 SEC
BH CV ECHO MEAS - MV A MAX VEL: 53.5 CM/SEC
BH CV ECHO MEAS - MV DEC SLOPE: 266.6 CM/SEC2
BH CV ECHO MEAS - MV DEC TIME: 0.19 SEC
BH CV ECHO MEAS - MV E MAX VEL: 57.1 CM/SEC
BH CV ECHO MEAS - MV E/A: 1.07
BH CV ECHO MEAS - MV MAX PG: 2.01 MMHG
BH CV ECHO MEAS - MV MEAN PG: 1.15 MMHG
BH CV ECHO MEAS - MV P1/2T: 73.3 MSEC
BH CV ECHO MEAS - MV V2 VTI: 17.2 CM
BH CV ECHO MEAS - MVA(P1/2T): 3 CM2
BH CV ECHO MEAS - MVA(VTI): 2.9 CM2
BH CV ECHO MEAS - PA ACC TIME: 0.1 SEC
BH CV ECHO MEAS - PA V2 MAX: 96.5 CM/SEC
BH CV ECHO MEAS - PULM DIAS VEL: 41.4 CM/SEC
BH CV ECHO MEAS - PULM S/D: 1.27
BH CV ECHO MEAS - PULM SYS VEL: 52.5 CM/SEC
BH CV ECHO MEAS - QP/QS: 1.42
BH CV ECHO MEAS - RAP SYSTOLE: 3 MMHG
BH CV ECHO MEAS - RV MAX PG: 4 MMHG
BH CV ECHO MEAS - RV V1 MAX: 100.1 CM/SEC
BH CV ECHO MEAS - RV V1 VTI: 14.4 CM
BH CV ECHO MEAS - RVOT DIAM: 2.5 CM
BH CV ECHO MEAS - SV(LVOT): 50.2 ML
BH CV ECHO MEAS - SV(MOD-SP2): 36 ML
BH CV ECHO MEAS - SV(MOD-SP4): 30 ML
BH CV ECHO MEAS - SV(RVOT): 71.4 ML
BH CV ECHO MEAS - SVI(LVOT): 24.6 ML/M2
BH CV ECHO MEAS - SVI(MOD-SP2): 17.7 ML/M2
BH CV ECHO MEAS - SVI(MOD-SP4): 14.7 ML/M2
BH CV ECHO MEASUREMENTS AVERAGE E/E' RATIO: 4.82
BH CV XLRA - RV BASE: 2.9 CM
BH CV XLRA - RV LENGTH: 9.2 CM
BH CV XLRA - RV MID: 3 CM
BH CV XLRA - TDI S': 15.7 CM/SEC
LEFT ATRIUM VOLUME INDEX: 17.8 ML/M2
SINUS: 2.9 CM
STJ: 2.5 CM

## 2024-07-19 PROCEDURE — 93306 TTE W/DOPPLER COMPLETE: CPT

## 2024-07-23 LAB
A PHAGOCYTOPH DNA BLD QL NAA+PROBE: NEGATIVE
BACTERIA SPEC AEROBE CULT: NORMAL
BACTERIA SPEC AEROBE CULT: NORMAL
E CHAFFEENSIS DNA BLD QL NAA+PROBE: NEGATIVE

## 2024-07-26 LAB — RICKETTSIA RICKETTSII DNA, RT: NOT DETECTED

## 2024-08-19 DIAGNOSIS — R11.11 VOMITING WITHOUT NAUSEA, UNSPECIFIED VOMITING TYPE: ICD-10-CM

## 2024-08-19 NOTE — TELEPHONE ENCOUNTER
"Caller: Jefferson Chavez \"PA\"    Relationship: Self    Best call back number: [phone]    Requested Prescriptions:   Requested Prescriptions     Pending Prescriptions Disp Refills    ondansetron (Zofran) 4 MG tablet 15 tablet 0     Sig: Take 1 tablet by mouth Every 12 (Twelve) Hours As Needed for Nausea or Vomiting.        Pharm[id number] [address]  AT [facility] [address] [facility] [phone] [facility] [phone]      Last office visit with prescribing clinician: 7/18/2024   Last telemedicine visit with prescribing clinician: Visit date not found   Next office visit with prescribing clinician: 11/18/2024     Additional details provided by patient: PATIENT HAS 2 PILLS LEFT JUST WANTED TO GET A REFILL     Does the patient have less than a 3 day supply:  [x] Yes  [] No    Would you like a call back once the refill request has been completed: [] Yes [x] No    If the office needs to give you a call back, can they leave a voicemail: [] Yes [x] No    Josué Bailey Rep   08/19/24 10:44 EDT           "

## 2024-08-20 DIAGNOSIS — R11.11 VOMITING WITHOUT NAUSEA, UNSPECIFIED VOMITING TYPE: ICD-10-CM

## 2024-08-20 RX ORDER — ONDANSETRON 4 MG/1
4 TABLET, FILM COATED ORAL EVERY 12 HOURS PRN
Qty: 15 TABLET | Refills: 0 | Status: SHIPPED | OUTPATIENT
Start: 2024-08-20

## 2024-08-20 RX ORDER — ONDANSETRON 4 MG/1
4 TABLET, FILM COATED ORAL EVERY 12 HOURS PRN
Qty: 15 TABLET | Refills: 0 | Status: SHIPPED | OUTPATIENT
Start: 2024-08-20 | End: 2024-08-20 | Stop reason: SDUPTHER

## 2024-10-04 ENCOUNTER — OFFICE VISIT (OUTPATIENT)
Dept: INTERNAL MEDICINE | Facility: CLINIC | Age: 49
End: 2024-10-04
Payer: COMMERCIAL

## 2024-10-04 VITALS
OXYGEN SATURATION: 96 % | HEIGHT: 73 IN | WEIGHT: 182 LBS | SYSTOLIC BLOOD PRESSURE: 147 MMHG | HEART RATE: 79 BPM | TEMPERATURE: 98.2 F | BODY MASS INDEX: 24.12 KG/M2 | DIASTOLIC BLOOD PRESSURE: 90 MMHG

## 2024-10-04 DIAGNOSIS — Z87.19 HISTORY OF PANCREATITIS: ICD-10-CM

## 2024-10-04 DIAGNOSIS — E87.1 HYPONATREMIA: ICD-10-CM

## 2024-10-04 DIAGNOSIS — K76.0 HEPATIC STEATOSIS: ICD-10-CM

## 2024-10-04 DIAGNOSIS — I10 ESSENTIAL HYPERTENSION: ICD-10-CM

## 2024-10-04 DIAGNOSIS — R74.01 TRANSAMINITIS: Primary | ICD-10-CM

## 2024-10-04 DIAGNOSIS — R17 ELEVATED BILIRUBIN: ICD-10-CM

## 2024-10-04 PROCEDURE — 99214 OFFICE O/P EST MOD 30 MIN: CPT | Performed by: STUDENT IN AN ORGANIZED HEALTH CARE EDUCATION/TRAINING PROGRAM

## 2024-10-04 RX ORDER — LOSARTAN POTASSIUM 100 MG/1
100 TABLET ORAL DAILY
Qty: 90 TABLET | OUTPATIENT
Start: 2024-10-04

## 2024-10-04 RX ORDER — LOSARTAN POTASSIUM 100 MG/1
100 TABLET ORAL DAILY
Qty: 30 TABLET | Refills: 1 | Status: SHIPPED | OUTPATIENT
Start: 2024-10-04

## 2024-10-04 NOTE — PROGRESS NOTES
"  Newton Sibley D.O.  Internal Medicine  [facility]  [address], [address]  [place] [address]  [phone]      Chief Complaint  Follow-up (On tick bite)    SUBJECTIVE    History of Present Illness    Jefferson Chavez is a 49 y.o. male who presents to the office today as an established patient that last saw me on 7/18/2024.     Pt states he is currently not drinking alcohol and he is feeling the best he has felt over the last 4 weeks.   He has been alcohol free for 1.5 months in total. No longer having vomiting or upset stomach.    Primary hypertension : takes losartan 50 mg daily . Checks BP twice weekly and he gets 140s/90s. And it can be up to 160.    No Known Allergies     Outpatient Medications Marked as Taking for the 10/4/24 encounter (Office Visit) with Newton Sibley, DO   Medication Sig Dispense Refill    rosuvastatin (CRESTOR) 40 MG tablet Take 1 tablet by mouth Daily. 90 tablet 1    sertraline (ZOLOFT) 100 MG tablet TAKE 1 TABLET BY MOUTH DAILY 90 tablet 3    [DISCONTINUED] losartan (Cozaar) 50 MG tablet Take 1 tablet by mouth Daily. 90 tablet 1        Past Medical History:   Diagnosis Date    ADHD     Alcohol use disorder     Alcoholic steatohepatitis     Anxiety     Heartburn     Hyperlipidemia     Hypertension     SELENA (obstructive sleep apnea)     Subclinical hypothyroidism        OBJECTIVE    Vital Signs:   /90   Pulse 79   Temp 98.2 °F (36.8 °C) (Infrared)   Ht 185.4 cm (73\")   Wt 82.6 kg (182 lb)   SpO2 96%   BMI 24.01 kg/m²        Physical Exam  Vitals reviewed.   Constitutional:       General: He is not in acute distress.     Appearance: Normal appearance. He is not ill-appearing.   Eyes:      General: No scleral icterus.  Cardiovascular:      Rate and Rhythm: Normal rate and regular rhythm.      Heart sounds: Normal heart sounds. No murmur heard.  Pulmonary:      Effort: Pulmonary effort is normal. No respiratory distress.      Breath sounds: Normal breath " sounds. No wheezing.   Musculoskeletal:      Right lower leg: No edema.      Left lower leg: No edema.   Skin:     Coloration: Skin is not jaundiced.   Neurological:      Mental Status: He is alert.   Psychiatric:         Mood and Affect: Mood normal.         Behavior: Behavior normal.         Thought Content: Thought content normal.                             ASSESSMENT & PLAN     Diagnoses and all orders for this visit:    1. Transaminitis (Primary)  2. Hyponatremia  3. Elevated bilirubin  4. Hepatic steatosis  5. History of pancreatitis  -Pt states he is currently not drinking alcohol and he is feeling the best he has felt over the last 4 weeks.   He has been alcohol free for 1.5 months in total. No longer having vomiting or upset stomach.  Lab Results   Component Value Date    GLUCOSE 112 (H) 07/18/2024    BUN 20 07/18/2024    CREATININE 0.85 07/18/2024     (L) 07/18/2024    K 4.0 07/18/2024    CL 90 (L) 07/18/2024    CALCIUM 10.8 (H) 07/18/2024    PROTEINTOT 7.8 07/18/2024    ALBUMIN 5.2 07/18/2024     (H) 07/18/2024     (H) 07/18/2024    ALKPHOS 110 07/18/2024    BILITOT 3.0 (H) 07/18/2024    GLOB 2.6 07/18/2024    AGRATIO 2.0 07/18/2024    BCR 23.5 07/18/2024    ANIONGAP 16.0 (H) 07/18/2024    EGFR 107.9 07/18/2024     -last CMP with significant electrolyte abnormalities and mildly elevated lipase at 77. At that point he was recommended to go to the ER and a CT abdomen/pelvis was conducted which did not show imaging signs of pancreatitis. He was discharged home.   -I advised him it is very important to remain alcohol free completely moving forward. I will recheck CMP to ensure resolution of his electrolyte abnormalities since July. He does have chronic hepatic steatosis and this is another reason to remain alcohol free. He has a normal BMI.   -     Comprehensive Metabolic Panel    6. Essential hypertension  -takes losartan 50 mg daily . Checks BP twice weekly and he gets 140s/90s. And it  can be up to 160.  -BP similarly uncontrolled at 140s over 90 in office today.  Stressed importance of blood pressure control at such a young age in order to reduce risk of future kidney, vascular and heart disease.  At this point I recommend increasing losartan to 100 mg daily.  He has a follow-up scheduled next month and we will reassess BP and electrolytes at that time.  -     losartan (Cozaar) 100 MG tablet; Take 1 tablet by mouth Daily.  Dispense: 30 tablet; Refill: 1            Follow Up  Return for Next scheduled follow up.    Patient/family had no further questions at this time and verbalized understanding of the plan discussed today.

## 2024-11-15 DIAGNOSIS — E78.2 MIXED HYPERLIPIDEMIA: ICD-10-CM

## 2024-11-15 DIAGNOSIS — I10 ESSENTIAL HYPERTENSION: ICD-10-CM

## 2024-11-15 NOTE — TELEPHONE ENCOUNTER
"  Caller: Jefferson Chavez \"PA\"    Relationship: Self    Best call back number:     [phone]       Requested Prescriptions:   Requested Prescriptions     Pending Prescriptions Disp Refills    sertraline (ZOLOFT) 100 MG tablet 90 tablet 3     Sig: Take 1 tablet by mouth Daily.    losartan (Cozaar) 100 MG tablet 30 tablet 1     Sig: Take 1 tablet by mouth Daily.    rosuvastatin (CRESTOR) 40 MG tablet 90 tablet 1     Sig: Take 1 tablet by mouth Daily.        Pharmacy where request should be sent: [facility] #[id number] [address] AT [facility] - [phone]  - [phone] FX     Last office visit with prescribing clinician: 10/4/2024   Last telemedicine visit with prescribing clinician: Visit date not found   Next office visit with prescribing clinician: Visit date not found     Additional details provided by patient:     Does the patient have less than a 3 day supply:  [] Yes  [x] No    Would you like a call back once the refill request has been completed: [] Yes [] No    If the office needs to give you a call back, can they leave a voicemail: [] Yes [] No    Josué Palomino Rep   11/15/24 12:05 EST         "

## 2024-11-25 RX ORDER — SERTRALINE HYDROCHLORIDE 100 MG/1
100 TABLET, FILM COATED ORAL DAILY
Qty: 90 TABLET | Refills: 1 | Status: SHIPPED | OUTPATIENT
Start: 2024-11-25

## 2024-11-25 RX ORDER — ROSUVASTATIN CALCIUM 40 MG/1
40 TABLET, COATED ORAL DAILY
Qty: 90 TABLET | Refills: 1 | Status: SHIPPED | OUTPATIENT
Start: 2024-11-25

## 2024-11-25 RX ORDER — LOSARTAN POTASSIUM 100 MG/1
100 TABLET ORAL DAILY
Qty: 90 TABLET | Refills: 1 | Status: SHIPPED | OUTPATIENT
Start: 2024-11-25